# Patient Record
Sex: FEMALE | Race: WHITE | Employment: OTHER | URBAN - METROPOLITAN AREA
[De-identification: names, ages, dates, MRNs, and addresses within clinical notes are randomized per-mention and may not be internally consistent; named-entity substitution may affect disease eponyms.]

---

## 2019-03-27 ENCOUNTER — HOSPITAL ENCOUNTER (OUTPATIENT)
Dept: GENERAL RADIOLOGY | Age: 74
Discharge: HOME | End: 2019-03-27
Payer: MEDICARE

## 2019-03-27 DIAGNOSIS — Z79.891 LONG TERM (CURRENT) USE OF OPIATE ANALGESIC: ICD-10-CM

## 2019-03-27 DIAGNOSIS — M48.062 SPINAL STENOSIS, LUMBAR REGION, WITH NEUROGENIC CLAUDICATION: ICD-10-CM

## 2019-03-27 DIAGNOSIS — M54.50 LOWER BACK PAIN: ICD-10-CM

## 2019-03-27 PROCEDURE — 72110 X-RAY EXAM L-2 SPINE 4/>VWS: CPT

## 2019-07-15 ENCOUNTER — HOSPITAL ENCOUNTER (OUTPATIENT)
Dept: MRI IMAGING | Age: 74
Discharge: HOME | End: 2019-07-15
Attending: PHYSICAL MEDICINE & REHABILITATION
Payer: MEDICARE

## 2019-07-15 DIAGNOSIS — M54.50 LUMBAGO: ICD-10-CM

## 2019-07-15 DIAGNOSIS — Z79.891 LONG-TERM CURRENT USE OF OPIATE ANALGESIC: ICD-10-CM

## 2019-07-15 DIAGNOSIS — M48.062 SPINAL STENOSIS, LUMBAR REGION WITH NEUROGENIC CLAUDICATION: ICD-10-CM

## 2019-07-15 DIAGNOSIS — M25.511 PAIN IN RIGHT SHOULDER: ICD-10-CM

## 2019-07-15 PROCEDURE — 72148 MRI LUMBAR SPINE W/O DYE: CPT

## 2020-06-16 ENCOUNTER — APPOINTMENT (OUTPATIENT)
Dept: GENERAL RADIOLOGY | Age: 75
DRG: 872 | End: 2020-06-16
Attending: NURSE PRACTITIONER
Payer: MEDICARE

## 2020-06-16 ENCOUNTER — HOSPITAL ENCOUNTER (INPATIENT)
Age: 75
LOS: 7 days | Discharge: HOME HEALTH | DRG: 872 | End: 2020-06-23
Attending: EMERGENCY MEDICINE | Admitting: INTERNAL MEDICINE
Payer: MEDICARE

## 2020-06-16 ENCOUNTER — APPOINTMENT (OUTPATIENT)
Dept: CT IMAGING | Age: 75
DRG: 872 | End: 2020-06-16
Attending: NURSE PRACTITIONER
Payer: MEDICARE

## 2020-06-16 DIAGNOSIS — N19 PRERENAL RENAL FAILURE: ICD-10-CM

## 2020-06-16 DIAGNOSIS — S22.31XA CLOSED FRACTURE OF ONE RIB OF RIGHT SIDE, INITIAL ENCOUNTER: ICD-10-CM

## 2020-06-16 DIAGNOSIS — R31.9 URINARY TRACT INFECTION WITH HEMATURIA, SITE UNSPECIFIED: Primary | ICD-10-CM

## 2020-06-16 DIAGNOSIS — A41.9 SEPSIS, DUE TO UNSPECIFIED ORGANISM, UNSPECIFIED WHETHER ACUTE ORGAN DYSFUNCTION PRESENT (HCC): ICD-10-CM

## 2020-06-16 DIAGNOSIS — T79.6XXA TRAUMATIC RHABDOMYOLYSIS, INITIAL ENCOUNTER (HCC): ICD-10-CM

## 2020-06-16 DIAGNOSIS — N39.0 URINARY TRACT INFECTION WITH HEMATURIA, SITE UNSPECIFIED: Primary | ICD-10-CM

## 2020-06-16 PROBLEM — K74.60 CIRRHOSIS (HCC): Status: ACTIVE | Noted: 2020-06-16

## 2020-06-16 PROBLEM — E03.9 HYPOTHYROIDISM: Status: ACTIVE | Noted: 2020-06-16

## 2020-06-16 PROBLEM — R74.01 TRANSAMINITIS: Status: ACTIVE | Noted: 2020-06-16

## 2020-06-16 PROBLEM — R91.8 LUNG NODULES: Status: ACTIVE | Noted: 2020-06-16

## 2020-06-16 PROBLEM — R65.10 SIRS (SYSTEMIC INFLAMMATORY RESPONSE SYNDROME) (HCC): Status: ACTIVE | Noted: 2020-06-16

## 2020-06-16 PROBLEM — M62.82 RHABDOMYOLYSIS: Status: ACTIVE | Noted: 2020-06-16

## 2020-06-16 PROBLEM — I48.92 ATRIAL FLUTTER (HCC): Status: ACTIVE | Noted: 2020-06-16

## 2020-06-16 PROBLEM — I10 HTN (HYPERTENSION): Status: ACTIVE | Noted: 2020-06-16

## 2020-06-16 PROBLEM — S22.39XA RIB FRACTURE: Status: ACTIVE | Noted: 2020-06-16

## 2020-06-16 PROBLEM — T68.XXXA HYPOTHERMIA: Status: ACTIVE | Noted: 2020-06-16

## 2020-06-16 PROBLEM — N28.9 RENAL INSUFFICIENCY: Status: ACTIVE | Noted: 2020-06-16

## 2020-06-16 LAB
ALBUMIN SERPL-MCNC: 3.2 G/DL (ref 3.5–5)
ALBUMIN/GLOB SERPL: 0.8 {RATIO} (ref 1.1–2.2)
ALP SERPL-CCNC: 131 U/L (ref 45–117)
ALT SERPL-CCNC: 107 U/L (ref 12–78)
ANION GAP SERPL CALC-SCNC: 12 MMOL/L (ref 5–15)
APPEARANCE UR: ABNORMAL
AST SERPL-CCNC: 113 U/L (ref 15–37)
BACTERIA URNS QL MICRO: ABNORMAL /HPF
BASOPHILS # BLD: 0.1 K/UL (ref 0–0.1)
BASOPHILS NFR BLD: 0 % (ref 0–1)
BILIRUB SERPL-MCNC: 0.8 MG/DL (ref 0.2–1)
BILIRUB UR QL: NEGATIVE
BUN SERPL-MCNC: 47 MG/DL (ref 6–20)
BUN/CREAT SERPL: 38 (ref 12–20)
CALCIUM SERPL-MCNC: 8.9 MG/DL (ref 8.5–10.1)
CHLORIDE SERPL-SCNC: 106 MMOL/L (ref 97–108)
CK SERPL-CCNC: 2758 U/L (ref 26–192)
CO2 SERPL-SCNC: 22 MMOL/L (ref 21–32)
COLOR UR: ABNORMAL
COMMENT, HOLDF: NORMAL
CREAT SERPL-MCNC: 1.24 MG/DL (ref 0.55–1.02)
CREAT UR-MCNC: 76 MG/DL
DIFFERENTIAL METHOD BLD: ABNORMAL
EOSINOPHIL # BLD: 0 K/UL (ref 0–0.4)
EOSINOPHIL NFR BLD: 0 % (ref 0–7)
EPITH CASTS URNS QL MICRO: ABNORMAL /LPF
ERYTHROCYTE [DISTWIDTH] IN BLOOD BY AUTOMATED COUNT: 16 % (ref 11.5–14.5)
GLOBULIN SER CALC-MCNC: 4.1 G/DL (ref 2–4)
GLUCOSE SERPL-MCNC: 114 MG/DL (ref 65–100)
GLUCOSE UR STRIP.AUTO-MCNC: NEGATIVE MG/DL
HCT VFR BLD AUTO: 37.7 % (ref 35–47)
HGB BLD-MCNC: 12.2 G/DL (ref 11.5–16)
HGB UR QL STRIP: ABNORMAL
IMM GRANULOCYTES # BLD AUTO: 0.1 K/UL (ref 0–0.04)
IMM GRANULOCYTES NFR BLD AUTO: 0 % (ref 0–0.5)
KETONES UR QL STRIP.AUTO: 40 MG/DL
LACTATE BLD-SCNC: 1.82 MMOL/L (ref 0.4–2)
LEUKOCYTE ESTERASE UR QL STRIP.AUTO: ABNORMAL
LYMPHOCYTES # BLD: 1 K/UL (ref 0.8–3.5)
LYMPHOCYTES NFR BLD: 5 % (ref 12–49)
MAGNESIUM SERPL-MCNC: 2.8 MG/DL (ref 1.6–2.4)
MCH RBC QN AUTO: 26.6 PG (ref 26–34)
MCHC RBC AUTO-ENTMCNC: 32.4 G/DL (ref 30–36.5)
MCV RBC AUTO: 82.3 FL (ref 80–99)
MONOCYTES # BLD: 1.3 K/UL (ref 0–1)
MONOCYTES NFR BLD: 6 % (ref 5–13)
MUCOUS THREADS URNS QL MICRO: ABNORMAL /LPF
NEUTS SEG # BLD: 18.4 K/UL (ref 1.8–8)
NEUTS SEG NFR BLD: 89 % (ref 32–75)
NITRITE UR QL STRIP.AUTO: NEGATIVE
NRBC # BLD: 0 K/UL (ref 0–0.01)
NRBC BLD-RTO: 0 PER 100 WBC
PH UR STRIP: 5.5 [PH] (ref 5–8)
PLATELET # BLD AUTO: 414 K/UL (ref 150–400)
PMV BLD AUTO: 10.3 FL (ref 8.9–12.9)
POTASSIUM SERPL-SCNC: 3.7 MMOL/L (ref 3.5–5.1)
POTASSIUM UR-SCNC: 36 MMOL/L
PROT SERPL-MCNC: 7.3 G/DL (ref 6.4–8.2)
PROT UR STRIP-MCNC: 30 MG/DL
RBC # BLD AUTO: 4.58 M/UL (ref 3.8–5.2)
RBC #/AREA URNS HPF: ABNORMAL /HPF (ref 0–5)
SAMPLES BEING HELD,HOLD: NORMAL
SODIUM SERPL-SCNC: 140 MMOL/L (ref 136–145)
SODIUM UR-SCNC: 18 MMOL/L
SP GR UR REFRACTOMETRY: 1.02 (ref 1–1.03)
TROPONIN I SERPL-MCNC: <0.05 NG/ML
TSH SERPL DL<=0.05 MIU/L-ACNC: 0.1 UIU/ML (ref 0.36–3.74)
UR CULT HOLD, URHOLD: NORMAL
UROBILINOGEN UR QL STRIP.AUTO: 1 EU/DL (ref 0.2–1)
WBC # BLD AUTO: 20.8 K/UL (ref 3.6–11)
WBC URNS QL MICRO: ABNORMAL /HPF (ref 0–4)

## 2020-06-16 PROCEDURE — 77030038269 HC DRN EXT URIN PURWCK BARD -A

## 2020-06-16 PROCEDURE — 72125 CT NECK SPINE W/O DYE: CPT

## 2020-06-16 PROCEDURE — 74011250636 HC RX REV CODE- 250/636: Performed by: EMERGENCY MEDICINE

## 2020-06-16 PROCEDURE — 93005 ELECTROCARDIOGRAM TRACING: CPT

## 2020-06-16 PROCEDURE — 65660000000 HC RM CCU STEPDOWN

## 2020-06-16 PROCEDURE — 71250 CT THORAX DX C-: CPT

## 2020-06-16 PROCEDURE — 83605 ASSAY OF LACTIC ACID: CPT

## 2020-06-16 PROCEDURE — 99285 EMERGENCY DEPT VISIT HI MDM: CPT

## 2020-06-16 PROCEDURE — 82550 ASSAY OF CK (CPK): CPT

## 2020-06-16 PROCEDURE — 80053 COMPREHEN METABOLIC PANEL: CPT

## 2020-06-16 PROCEDURE — 74011250637 HC RX REV CODE- 250/637: Performed by: INTERNAL MEDICINE

## 2020-06-16 PROCEDURE — 73562 X-RAY EXAM OF KNEE 3: CPT

## 2020-06-16 PROCEDURE — 77030019905 HC CATH URETH INTMIT MDII -A

## 2020-06-16 PROCEDURE — 83735 ASSAY OF MAGNESIUM: CPT

## 2020-06-16 PROCEDURE — 82570 ASSAY OF URINE CREATININE: CPT

## 2020-06-16 PROCEDURE — 84300 ASSAY OF URINE SODIUM: CPT

## 2020-06-16 PROCEDURE — 70450 CT HEAD/BRAIN W/O DYE: CPT

## 2020-06-16 PROCEDURE — 87040 BLOOD CULTURE FOR BACTERIA: CPT

## 2020-06-16 PROCEDURE — 85025 COMPLETE CBC W/AUTO DIFF WBC: CPT

## 2020-06-16 PROCEDURE — 84443 ASSAY THYROID STIM HORMONE: CPT

## 2020-06-16 PROCEDURE — 87086 URINE CULTURE/COLONY COUNT: CPT

## 2020-06-16 PROCEDURE — 84484 ASSAY OF TROPONIN QUANT: CPT

## 2020-06-16 PROCEDURE — 84439 ASSAY OF FREE THYROXINE: CPT

## 2020-06-16 PROCEDURE — 74176 CT ABD & PELVIS W/O CONTRAST: CPT

## 2020-06-16 PROCEDURE — 87077 CULTURE AEROBIC IDENTIFY: CPT

## 2020-06-16 PROCEDURE — 36415 COLL VENOUS BLD VENIPUNCTURE: CPT

## 2020-06-16 PROCEDURE — 74011000250 HC RX REV CODE- 250: Performed by: NURSE PRACTITIONER

## 2020-06-16 PROCEDURE — 81001 URINALYSIS AUTO W/SCOPE: CPT

## 2020-06-16 PROCEDURE — 84133 ASSAY OF URINE POTASSIUM: CPT

## 2020-06-16 PROCEDURE — 87186 SC STD MICRODIL/AGAR DIL: CPT

## 2020-06-16 PROCEDURE — 96374 THER/PROPH/DIAG INJ IV PUSH: CPT

## 2020-06-16 PROCEDURE — 96360 HYDRATION IV INFUSION INIT: CPT

## 2020-06-16 PROCEDURE — 74011250636 HC RX REV CODE- 250/636: Performed by: NURSE PRACTITIONER

## 2020-06-16 PROCEDURE — 74011250636 HC RX REV CODE- 250/636: Performed by: INTERNAL MEDICINE

## 2020-06-16 RX ORDER — SODIUM CHLORIDE 0.9 % (FLUSH) 0.9 %
5-40 SYRINGE (ML) INJECTION EVERY 8 HOURS
Status: DISCONTINUED | OUTPATIENT
Start: 2020-06-16 | End: 2020-06-23 | Stop reason: HOSPADM

## 2020-06-16 RX ORDER — SODIUM CHLORIDE, SODIUM LACTATE, POTASSIUM CHLORIDE, CALCIUM CHLORIDE 600; 310; 30; 20 MG/100ML; MG/100ML; MG/100ML; MG/100ML
100 INJECTION, SOLUTION INTRAVENOUS CONTINUOUS
Status: DISCONTINUED | OUTPATIENT
Start: 2020-06-16 | End: 2020-06-21

## 2020-06-16 RX ORDER — FUROSEMIDE 40 MG/1
40 TABLET ORAL
COMMUNITY

## 2020-06-16 RX ORDER — HYDROCODONE BITARTRATE AND ACETAMINOPHEN 10; 325 MG/1; MG/1
1.5 TABLET ORAL
COMMUNITY

## 2020-06-16 RX ORDER — AMLODIPINE BESYLATE 10 MG/1
10 TABLET ORAL DAILY
COMMUNITY

## 2020-06-16 RX ORDER — METRONIDAZOLE 500 MG/100ML
500 INJECTION, SOLUTION INTRAVENOUS EVERY 8 HOURS
Status: DISCONTINUED | OUTPATIENT
Start: 2020-06-16 | End: 2020-06-19

## 2020-06-16 RX ORDER — PRAVASTATIN SODIUM 20 MG/1
20 TABLET ORAL
Status: DISCONTINUED | OUTPATIENT
Start: 2020-06-16 | End: 2020-06-23 | Stop reason: HOSPADM

## 2020-06-16 RX ORDER — OMEPRAZOLE 20 MG/1
20 CAPSULE, DELAYED RELEASE ORAL DAILY
COMMUNITY

## 2020-06-16 RX ORDER — NALOXONE HYDROCHLORIDE 0.4 MG/ML
0.4 INJECTION, SOLUTION INTRAMUSCULAR; INTRAVENOUS; SUBCUTANEOUS AS NEEDED
Status: DISCONTINUED | OUTPATIENT
Start: 2020-06-16 | End: 2020-06-23 | Stop reason: HOSPADM

## 2020-06-16 RX ORDER — METOPROLOL SUCCINATE 50 MG/1
75 TABLET, EXTENDED RELEASE ORAL DAILY
COMMUNITY

## 2020-06-16 RX ORDER — LOSARTAN POTASSIUM 25 MG/1
25 TABLET ORAL DAILY
COMMUNITY

## 2020-06-16 RX ORDER — ACETAMINOPHEN 325 MG/1
650 TABLET ORAL
Status: DISCONTINUED | OUTPATIENT
Start: 2020-06-16 | End: 2020-06-23 | Stop reason: HOSPADM

## 2020-06-16 RX ORDER — GABAPENTIN 300 MG/1
300 CAPSULE ORAL 3 TIMES DAILY
COMMUNITY

## 2020-06-16 RX ORDER — LEVOTHYROXINE SODIUM 50 UG/1
50 TABLET ORAL
Status: DISCONTINUED | OUTPATIENT
Start: 2020-06-17 | End: 2020-06-18

## 2020-06-16 RX ORDER — PRAVASTATIN SODIUM 20 MG/1
20 TABLET ORAL
COMMUNITY

## 2020-06-16 RX ORDER — SODIUM CHLORIDE 0.9 % (FLUSH) 0.9 %
5-40 SYRINGE (ML) INJECTION AS NEEDED
Status: DISCONTINUED | OUTPATIENT
Start: 2020-06-16 | End: 2020-06-23 | Stop reason: HOSPADM

## 2020-06-16 RX ORDER — OXYCODONE AND ACETAMINOPHEN 5; 325 MG/1; MG/1
1 TABLET ORAL
Status: COMPLETED | OUTPATIENT
Start: 2020-06-16 | End: 2020-06-16

## 2020-06-16 RX ORDER — SODIUM CHLORIDE, SODIUM LACTATE, POTASSIUM CHLORIDE, CALCIUM CHLORIDE 600; 310; 30; 20 MG/100ML; MG/100ML; MG/100ML; MG/100ML
2000 INJECTION, SOLUTION INTRAVENOUS CONTINUOUS
Status: DISCONTINUED | OUTPATIENT
Start: 2020-06-16 | End: 2020-06-16

## 2020-06-16 RX ORDER — PANTOPRAZOLE SODIUM 40 MG/1
40 TABLET, DELAYED RELEASE ORAL
Status: DISCONTINUED | OUTPATIENT
Start: 2020-06-17 | End: 2020-06-23 | Stop reason: HOSPADM

## 2020-06-16 RX ORDER — LEVOTHYROXINE SODIUM 50 UG/1
50 TABLET ORAL
COMMUNITY

## 2020-06-16 RX ADMIN — PRAVASTATIN SODIUM 20 MG: 20 TABLET ORAL at 21:09

## 2020-06-16 RX ADMIN — SODIUM CHLORIDE, SODIUM LACTATE, POTASSIUM CHLORIDE, AND CALCIUM CHLORIDE 2000 ML: 600; 310; 30; 20 INJECTION, SOLUTION INTRAVENOUS at 15:55

## 2020-06-16 RX ADMIN — OXYCODONE HYDROCHLORIDE AND ACETAMINOPHEN 1 TABLET: 5; 325 TABLET ORAL at 18:09

## 2020-06-16 RX ADMIN — WATER 2 G: 1 INJECTION INTRAMUSCULAR; INTRAVENOUS; SUBCUTANEOUS at 15:55

## 2020-06-16 RX ADMIN — METRONIDAZOLE 500 MG: 500 INJECTION, SOLUTION INTRAVENOUS at 18:09

## 2020-06-16 RX ADMIN — APIXABAN 5 MG: 5 TABLET, FILM COATED ORAL at 18:09

## 2020-06-16 RX ADMIN — SODIUM CHLORIDE, SODIUM LACTATE, POTASSIUM CHLORIDE, AND CALCIUM CHLORIDE 75 ML/HR: 600; 310; 30; 20 INJECTION, SOLUTION INTRAVENOUS at 19:14

## 2020-06-16 RX ADMIN — SODIUM CHLORIDE 1000 ML: 900 INJECTION, SOLUTION INTRAVENOUS at 15:55

## 2020-06-16 RX ADMIN — Medication 10 ML: at 19:14

## 2020-06-16 NOTE — PROGRESS NOTES
BSI: MED RECONCILIATION    Comments/Recommendations:   Patient alert and oriented for medication reconciliation, bottles present at bedside for review and patient confirmed this was all of her medications. Patient took Eliquis this morning prior to hospital arrival but has not taken any other medications for 2 days due to acute situation. Patient obtains Eliquis samples from outpatient MD.   Confirmed allergies and updated preferred pharmacy to [facility] on [facility] [place]. Medications added:     Gabapentin  Metoprolol succ  Norco  Pravastatin  Furosemide  Omeprazole  Levothyroxine  Losartan  Amlodipine  Eliquis     Medications removed:    none    Medications adjusted:    none    Information obtained from: patient, medication bottles, [facility]     Allergies: Metformin    Prior to Admission Medications:     Medication Documentation Review Audit       Reviewed by Kosta Olsen (Pharmacist) on 06/16/20 at 1643      Medication Sig Documenting Provider Last Dose Status Taking? amLODIPine (NORVASC) 10 mg tablet Take 10 mg by mouth daily. Provider, Historical 6/14/2020 Active Yes   apixaban (Eliquis) 5 mg tablet Take 5 mg by mouth two (2) times a day. Provider, Historical 6/16/2020 AM Active Yes   furosemide (LASIX) 40 mg tablet Take 40 mg by mouth daily as needed. Provider, Historical  Active Yes   gabapentin (NEURONTIN) 300 mg capsule Take 300 mg by mouth three (3) times daily. Provider, Historical 6/14/2020 Active Yes   HYDROcodone-acetaminophen (NORCO)  mg tablet Take 1.5 Tabs by mouth every six (6) hours as needed for Pain. Provider, Historical  Active Yes   levothyroxine (SYNTHROID) 50 mcg tablet Take 50 mcg by mouth Daily (before breakfast). Provider, Historical 6/14/2020 Active Yes   losartan (COZAAR) 25 mg tablet Take 25 mg by mouth daily. Provider, Historical 6/14/2020 Active Yes   metoprolol succinate (TOPROL-XL) 50 mg XL tablet Take 75 mg by mouth daily.  Provider, Historical 6/14/2020 Active Yes   omeprazole (PRILOSEC) 20 mg capsule Take 20 mg by mouth daily. Provider, Historical 6/14/2020 Active Yes   pravastatin (PRAVACHOL) 20 mg tablet Take 20 mg by mouth nightly.  Provider, Historical 6/14/2020 Active Yes                      Thank  You,   Jesus Tidwell, PharmD, BCPS   Contact: [phone]

## 2020-06-16 NOTE — ED PROVIDER NOTES
This is a 65-year-old female with a reported past medical history of osteoarthritis (s/p multiple unspecified lower extremity surgical procedures), atrial fibrillation, hypertension, and hyperlipidemia who presents the ER today for the chief complaints of diffuse body pain after a ground-level fall that occurred Sunday afternoon. She states that sometime Sunday afternoon, around 3 PM, she had a fall (unknown if syncopal or mechanical) and was unable to get up. She then remained on the ground all day Sunday, all day Monday, until a neighbor checked on her earlier today and found her on the ground covered in urine and feces. She has a poor recollection around the fall, but states that she was not able to get up because \"I felt too weak\". She states that earlier on Sunday before her fall she was having multiple episodes of loose stool and poor appetite. She also reports having generalized fatigue, poor appetite, and rigors. After the fall, she developed debilitating diffuse body pain, which prevented her from going from a supine position on the floor to a standing position. She is unable to accurately locate the areas of her pain, she simply repeats \"I hurt everywhere\". She reports during her time on the floor she had nothing to eat or drink. At this time, she denies acute visual disturbances, chest pain, palpitations, shortness of breath, loss of sensation in her extremities, and neck pain. No past medical history on file. No past surgical history on file. No family history on file.     Social History     Socioeconomic History    Marital status:      Spouse name: Not on file    Number of children: Not on file    Years of education: Not on file    Highest education level: Not on file   Occupational History    Not on file   Social Needs    Financial resource strain: Not on file    Food insecurity     Worry: Not on file     Inability: Not on file   [employer] needs Medical: Not on file     Non-medical: Not on file   Tobacco Use    Smoking status: Not on file   Substance and Sexual Activity    Alcohol use: Not on file    Drug use: Not on file    Sexual activity: Not on file   Lifestyle    Physical activity     Days per week: Not on file     Minutes per session: Not on file    Stress: Not on file   Relationships    Social connections     Talks on phone: Not on file     Gets together: Not on file     Attends Holiness service: Not on file     Active member of club or organization: Not on file     Attends meetings of clubs or organizations: Not on file     Relationship status: Not on file    Intimate partner violence     Fear of current or ex partner: Not on file     Emotionally abused: Not on file     Physically abused: Not on file     Forced sexual activity: Not on file   Other Topics Concern    Not on file   Social History Narrative    Not on file         ALLERGIES: Metformin    Review of Systems   Constitutional: Positive for activity change, appetite change, chills and fatigue. Negative for fever. HENT: Negative for sore throat and trouble swallowing. Eyes: Negative for photophobia and visual disturbance. Respiratory: Positive for cough. Negative for shortness of breath. Cardiovascular: Negative for chest pain, palpitations and leg swelling. Gastrointestinal: Positive for abdominal pain, diarrhea and nausea. Negative for abdominal distention and vomiting. Endocrine: Positive for cold intolerance. Genitourinary: Negative for dysuria and flank pain. Musculoskeletal: Positive for arthralgias, back pain, gait problem, joint swelling and myalgias. Negative for neck stiffness. Skin: Negative for rash. Neurological: Positive for dizziness and weakness. Hematological: Bruises/bleeds easily.        Vitals:    06/16/20 1402 06/16/20 1405 06/16/20 1408 06/16/20 1411   BP: 122/70 120/61 112/69    Pulse: (!) 125 (!) 120 (!) 125    Resp: 19 20 19    Temp: SpO2: 100% 100% 100% 100%   Weight:       Height:                Physical Exam  Vitals signs and nursing note reviewed. Constitutional:       General: She is in acute distress. Appearance: Normal appearance. She is obese. She is not ill-appearing. HENT:      Head: Normocephalic and atraumatic. Right Ear: External ear normal.      Left Ear: External ear normal.      Nose: Nose normal.      Mouth/Throat:      Mouth: Mucous membranes are dry. Eyes:      General: No scleral icterus. Extraocular Movements: Extraocular movements intact. Conjunctiva/sclera: Conjunctivae normal.      Pupils: Pupils are equal, round, and reactive to light. Neck:      Musculoskeletal: Normal range of motion and neck supple. No neck rigidity or muscular tenderness. Cardiovascular:      Rate and Rhythm: Tachycardia present. Pulses:           Radial pulses are 1+ on the right side and 1+ on the left side. Heart sounds: Normal heart sounds. No murmur. No friction rub. No gallop. Pulmonary:      Effort: Tachypnea present. Breath sounds: Normal breath sounds. Decreased air movement present. Abdominal:      General: Abdomen is flat. Bowel sounds are decreased. Palpations: Abdomen is rigid. There is hepatomegaly. Tenderness: There is generalized abdominal tenderness. Musculoskeletal: Normal range of motion. Arms:    Skin:     General: Skin is warm and dry. Coloration: Skin is not pale. Comments: Very dry mucous membranes   Neurological:      General: No focal deficit present. Mental Status: She is alert and oriented to person, place, and time. Psychiatric:         Mood and Affect: Mood normal.         Behavior: Behavior normal.         Thought Content:  Thought content normal.         Judgment: Judgment normal.          MDM      VITAL SIGNS:  Patient Vitals for the past 4 hrs:   Temp Pulse Resp BP SpO2   06/16/20 1446 (!) 95.9 °F (35.5 °C)       06/16/20 1437  (!) 126 25 118/74    06/16/20 1436     100 %   06/16/20 1434  (!) 126 18 119/63 97 %   06/16/20 1422  (!) 126 17 113/61 100 %   06/16/20 1417  (!) 126 19 128/78 100 %   06/16/20 1411     100 %   06/16/20 1411    116/80    06/16/20 1408  (!) 125 19 112/69 100 %   06/16/20 1405  (!) 120 20 120/61 100 %   06/16/20 1402  (!) 125 19 122/70 100 %   06/16/20 1400  (!) 125 22 121/69 100 %   06/16/20 1359 97.7 °F (36.5 °C) (!) 125 22 121/69 100 %   06/16/20 1356    121/74 100 %         LABS:  Recent Results (from the past 6 hour(s))   EKG, 12 LEAD, INITIAL    Collection Time: 06/16/20  2:02 PM   Result Value Ref Range    Ventricular Rate 125 BPM    Atrial Rate 250 BPM    QRS Duration 80 ms    Q-T Interval 310 ms    QTC Calculation (Bezet) 447 ms    Calculated R Axis 51 degrees    Calculated T Axis 50 degrees    Diagnosis       Atrial flutter with 2:1 AV conduction  ST & T wave abnormality, consider inferior ischemia  ST & T wave abnormality, consider anterior ischemia  Abnormal ECG  No previous ECGs available     POC LACTIC ACID    Collection Time: 06/16/20  2:05 PM   Result Value Ref Range    Lactic Acid (POC) 1.82 0.40 - 2.00 mmol/L   SAMPLES BEING HELD    Collection Time: 06/16/20  2:06 PM   Result Value Ref Range    SAMPLES BEING HELD 1SST,1RD,1BL     COMMENT        Add-on orders for these samples will be processed based on acceptable specimen integrity and analyte stability, which may vary by analyte.    CBC WITH AUTOMATED DIFF    Collection Time: 06/16/20  2:06 PM   Result Value Ref Range    WBC 20.8 (H) 3.6 - 11.0 K/uL    RBC 4.58 3.80 - 5.20 M/uL    HGB 12.2 11.5 - 16.0 g/dL    HCT 37.7 35.0 - 47.0 %    MCV 82.3 80.0 - 99.0 FL    MCH 26.6 26.0 - 34.0 PG    MCHC 32.4 30.0 - 36.5 g/dL    RDW 16.0 (H) 11.5 - 14.5 %    PLATELET 044 (H) 152 - 400 K/uL    MPV 10.3 8.9 - 12.9 FL    NRBC 0.0 0  WBC    ABSOLUTE NRBC 0.00 0.00 - 0.01 K/uL    NEUTROPHILS 89 (H) 32 - 75 %    LYMPHOCYTES 5 (L) 12 - 49 %    MONOCYTES 6 5 - 13 %    EOSINOPHILS 0 0 - 7 %    BASOPHILS 0 0 - 1 %    IMMATURE GRANULOCYTES 0 0.0 - 0.5 %    ABS. NEUTROPHILS 18.4 (H) 1.8 - 8.0 K/UL    ABS. LYMPHOCYTES 1.0 0.8 - 3.5 K/UL    ABS. MONOCYTES 1.3 (H) 0.0 - 1.0 K/UL    ABS. EOSINOPHILS 0.0 0.0 - 0.4 K/UL    ABS. BASOPHILS 0.1 0.0 - 0.1 K/UL    ABS. IMM. GRANS. 0.1 (H) 0.00 - 0.04 K/UL    DF AUTOMATED     CK    Collection Time: 06/16/20  2:06 PM   Result Value Ref Range    CK 2,758 (H) 26 - 192 U/L   MAGNESIUM    Collection Time: 06/16/20  2:06 PM   Result Value Ref Range    Magnesium 2.8 (H) 1.6 - 2.4 mg/dL   METABOLIC PANEL, COMPREHENSIVE    Collection Time: 06/16/20  2:06 PM   Result Value Ref Range    Sodium 140 136 - 145 mmol/L    Potassium 3.7 3.5 - 5.1 mmol/L    Chloride 106 97 - 108 mmol/L    CO2 22 21 - 32 mmol/L    Anion gap 12 5 - 15 mmol/L    Glucose 114 (H) 65 - 100 mg/dL    BUN 47 (H) 6 - 20 MG/DL    Creatinine 1.24 (H) 0.55 - 1.02 MG/DL    BUN/Creatinine ratio 38 (H) 12 - 20      GFR est AA 51 (L) >60 ml/min/1.73m2    GFR est non-AA 42 (L) >60 ml/min/1.73m2    Calcium 8.9 8.5 - 10.1 MG/DL    Bilirubin, total 0.8 0.2 - 1.0 MG/DL    ALT (SGPT) 107 (H) 12 - 78 U/L    AST (SGOT) 113 (H) 15 - 37 U/L    Alk.  phosphatase 131 (H) 45 - 117 U/L    Protein, total 7.3 6.4 - 8.2 g/dL    Albumin 3.2 (L) 3.5 - 5.0 g/dL    Globulin 4.1 (H) 2.0 - 4.0 g/dL    A-G Ratio 0.8 (L) 1.1 - 2.2     TROPONIN I    Collection Time: 06/16/20  2:06 PM   Result Value Ref Range    Troponin-I, Qt. <0.05 <0.05 ng/mL   TSH 3RD GENERATION    Collection Time: 06/16/20  2:06 PM   Result Value Ref Range    TSH 0.10 (L) 0.36 - 3.74 uIU/mL   URINALYSIS W/MICROSCOPIC    Collection Time: 06/16/20  2:44 PM   Result Value Ref Range    Color YELLOW/STRAW      Appearance CLOUDY (A) CLEAR      Specific gravity 1.017 1.003 - 1.030      pH (UA) 5.5 5.0 - 8.0      Protein 30 (A) NEG mg/dL    Glucose Negative NEG mg/dL    Ketone 40 (A) NEG mg/dL    Bilirubin Negative NEG Blood LARGE (A) NEG      Urobilinogen 1.0 0.2 - 1.0 EU/dL    Nitrites Negative NEG      Leukocyte Esterase MODERATE (A) NEG      WBC  0 - 4 /hpf    RBC 0-5 0 - 5 /hpf    Epithelial cells FEW FEW /lpf    Bacteria 2+ (A) NEG /hpf    Mucus 1+ (A) NEG /lpf   URINE CULTURE HOLD SAMPLE    Collection Time: 06/16/20  2:44 PM   Result Value Ref Range    Urine culture hold        Urine on hold in Microbiology dept for 2 days. If unpreserved urine is submitted, it cannot be used for addtional testing after 24 hours, recollection will be required. IMAGING:  CT HEAD WO CONT   Final Result   IMPRESSION: No acute changes. CT SPINE CERV WO CONT   Final Result   IMPRESSION: Prominent spondylosis, no acute findings seen. CT CHEST WO CONT   Final Result   IMPRESSION:       Pulmonary micronodules   Incidental sigmoid diverticulosis. Cirrhosis with mild intrahepatic biliary dilatation. CT ABD PELV WO CONT   Final Result   IMPRESSION:       Pulmonary micronodules   Incidental sigmoid diverticulosis. Cirrhosis with mild intrahepatic biliary dilatation. XR KNEE LT 3 V   Final Result   IMPRESSION: Left total knee replacement. XR KNEE RT 3 V   Final Result   IMPRESSION: Right total knee replacement            Medications During Visit:  Medications   sodium chloride 0.9 % bolus infusion 1,000 mL (1,000 mL IntraVENous New Bag 6/16/20 1555)   lactated Ringers infusion 2,000 mL (2,000 mL IntraVENous New Bag 6/16/20 1555)   oxyCODONE-acetaminophen (PERCOCET) 5-325 mg per tablet 1 Tab (has no administration in time range)   cefTRIAXone (ROCEPHIN) 2 g in sterile water (preservative free) 20 mL IV syringe (2 g IntraVENous Given 6/16/20 1555)         DECISION MAKING:  Lawyer Dugan is a 75 y.o. female who comes in as above. 3:30:  Blood counts are significant for markedly elevated CK in the absence of troponin elevation. Suspect development of rhabdomyolysis secondary to prolonged downtime.   3 L of crystalloids ordered. WBC count greater than 20,000 with left shift. Urinalysis suspicious for UTI. Ceftriaxone started for antibiotic coverage. CT of abdomen and stool studies pending. 4:19 PM  Patient's level of alertness has seemed to improve after IV fluids. CT scans and x-rays are unremarkable for any acute process, however, she reports continual pain and inability to elevate her lower extremities. Percocet administered at this time. Repeat EKG to determine if patient is in sinus tachycardia versus A. fib/a flutter. Difficult to determine on initial EKG due to large degree of artifact. Hypothermia resolved with warm fluids and blankets    BUN to creatinine ratio suspicious for prerenal etiology. Urine electrolytes pending. Elevated CK with hematuria, with very few RBCs leans towards rhabdomyolysis. Aggressive hydration in process. 5:00 PM  Repeat EKG obtained at 1651. Sinus tachycardia at a rate of 123 bpm.    Sepsis Re-Assessment Documentation:     Date: 6/16/2020   Time: 4:21 PM      Vital Signs  Level of Consciousness: Alert  Temp: 97.8 °F (36.6 °C)  Temp Source: Oral  Pulse (Heart Rate): (!) 126  Heart Rate Source: Monitor  Cardiac Rhythm: Atrial fibrillation(aib rvr )  Resp Rate: 25  BP: 118/74  MAP (Monitor): 84  MAP (Calculated): 86  BP 1 Location: Left arm  BP 1 Method: Automatic  BP Patient Position: At rest      IMPRESSION:  1. Urinary tract infection with hematuria, site unspecified    2. Sepsis, due to unspecified organism, unspecified whether acute organ dysfunction present ([address]    3. Traumatic rhabdomyolysis, initial encounter ([address]    4. Prerenal renal failure        DISPOSITION:  Admitted      Hospitalist Perfect Serve for Admission  4:27 PM    ED Room Number: ER20/20  Patient Name and age:  Earlyne Arm 75 y.o.  female  Working Diagnosis:   1. Urinary tract infection with hematuria, site unspecified    2.  Sepsis, due to unspecified organism, unspecified whether acute organ dysfunction present ([facility]    3. Traumatic rhabdomyolysis, initial encounter ([facility]    4. Prerenal renal failure        COVID-19 Suspicion:  no    Code Status:  Full Code  Readmission: no  Isolation Requirements:  no - Enteric until stool studies result  Recommended Level of Care:  step down  Department:[address] ED - [phone]  Other: RASHMI likely due to mixture of prerenal and rhabdomyolysis (prolonged down time). Sepsis - fluid responsive. Ground-level fall with loss of consciousness, now nonweightbearing. CT scans / xrays unremarkable    I was personally available for consultation in the emergency department. I have reviewed the chart and agree with the documentation recorded by the [facility], including the assessment, treatment plan, and disposition.   Basil Scott MD

## 2020-06-16 NOTE — ACP (ADVANCE CARE PLANNING)
Advance Care Planning (ACP) Provider Note - Comprehensive      Date of ACP Conversation:  06/16/20    Persons included in Conversation:  patient   Length of ACP Conversation in minutes:  16 minutes     Authorized Decision Maker (if patient is incapable of making informed decisions): This person is: son, Kendall Jeffries, who lives in [facility] Patients with Decision Making Capacity:  Importance of advance care planning, including choosing a healthcare agent to communicate patient's healthcare decisions if patient lost the ability to make decisions. Review of Existing Advance Directive:  Patient does have a current advance directive however not readily available for review. She reports her son Kendall Jeffries being her MPOA     Active Diagnoses:   Patient Active Problem List   Diagnosis Code    Cirrhosis ([facility] K74.60    Transaminitis R74.0    Lung nodules R91.8    Atrial flutter ([facility] I48.92    HTN (hypertension) I10    Renal insufficiency N28.9    Rhabdomyolysis M62.82    SIRS (systemic inflammatory response syndrome) ([facility] R65.10    Hypothermia T68. [place]    Rib fracture S22.39XA    Sepsis ([facility] A41.9    Hypothyroidism E03.9         These active diagnoses are of sufficient risk that focused discussion on advance care planning is indicated in order to allow the patient to thoughtfully consider personal goals of care; and, if situations arise that prevent the ability to personally give input, to ensure appropriate representation of their personal desires for different levels and aggressiveness of care. For Serious or Chronic Illness:  Understanding of medical condition     Reviewed pt's clinical status. Carlita Ott has multiple medical problems including AF, HTN, and is being admitted for fall, rhabdo, AF with RVR, possible sepsis. We reviewed our treatment plan and anticipated discharge plans.  Further, we discussed pt's wishes on how she would like to proceed (aggressive/heroic resuscitation vs not intervening and allowing nature takes its course) if she were to suffer cardiopulmonary arrest.    Understanding of CPR, goals and expected outcomes, benefits and burdens discussed. Information on CPR success provided (many factors lower a patients chance of survival, including advanced age, performance status, malignancy, and presence of multiple comorbidities); Individual asked to communicate understanding of information in his/her own words. CPR works best to restart the heart when there is a sudden event, like a heart attack, in someone who is otherwise healthy. Unfortunately, CPR does not typically restart the heart for people who have serious health conditions or who are very sick. \"     \"In the event your heart stopped as a result of an underlying serious health condition, would you want attempts to be made to restart your heart (answer \"yes\" for attempt to resuscitate) or would you prefer a natural death (answer \"no\" for do not attempt to resuscitate)? \"    Patient made it clear to me that she would not want to remain on life support for a prolonged period however does want an attempt at heroic measures for resuscitation in the event of cardiopulmonary arrest, including chest compressions, defibrillation, intubation/mechanical ventilation. She is alert and oriented x 4 and informs me that her son is aware of her wishes.      FULL CODE

## 2020-06-16 NOTE — ED TRIAGE NOTES
Pt arrives via EMS for c/o GLF 2 days ago. EMS reports that pt fell 2 days ago outside and refused transport to the hospital. They also report that she has been outside on the ground for 2 days. C/o generalized pain, most specifically in back and left hip. Has not taken meds in 2 days. Hx of afib.

## 2020-06-16 NOTE — ED NOTES
TRANSFER - OUT REPORT:    Verbal report given to [facility] (name) on Erma Wright  being transferred to [facility] (unit) for routine progression of care       Report consisted of patients Situation, Background, Assessment and   Recommendations(SBAR). Information from the following report(s) SBAR, Kardex, ED Summary, Procedure Summary and Intake/Output was reviewed with the receiving nurse. Lines:   Peripheral IV 06/16/20 Right Antecubital (Active)   Site Assessment Clean, dry, & intact 6/16/2020  2:02 PM   Phlebitis Assessment 0 6/16/2020  2:02 PM   Infiltration Assessment 0 6/16/2020  2:02 PM   Dressing Status Clean, dry, & intact 6/16/2020  2:02 PM   Dressing Type Transparent 6/16/2020  2:02 PM   Hub Color/Line Status Pink 6/16/2020  2:02 PM   Action Taken Blood drawn 6/16/2020  2:02 PM   Alcohol Cap Used No 6/16/2020  2:02 PM       Peripheral IV 06/16/20 Left Antecubital (Active)        Opportunity for questions and clarification was provided.       Patient transported with:   Monitor  Registered Nurse

## 2020-06-16 NOTE — PROGRESS NOTES
Gave verbal sign-out over the phone at 5:43 PM to Dr. Anya Gamez whose practice has accepted care of this patient.       Yann Gibson MD  6/16/2020

## 2020-06-16 NOTE — H&P
[facility]  [address] [facility], [facility] 19  [phone]    Hospitalist Admission Note      NAME:  Tessie Reyes   :      MRN:  [id number]     PCP:  Kaitlyn Barry MD     Date of Service/Time:  2020 4:47 PM         Assessment / Plan:        SIRS (systemic inflammatory response syndrome): possible sepsis with 4/4 SIRS (hypothermia likely environmental as patient was found in her yard, leukocytosis, tachycardia, tachypnea). Likely from UTI. Not apparently severe sepsis. Lactate WNR. Also noting diarrhea. Send C. Diff. IV CTX and IV Flagyl for now, pending stool studies and urine /blood cultures. Cont IVF; monitor for volume overload     Fall: found down in her yard by neighbor, soiled in her own urine and feces due to inability to get up. Check echo, monitor on tele. PT/OT. Likely needs placement. CM consult      Atrial flutter with RVR: HR expected to decrease with treatment as above; monitor closely for need to start IV diltiazem gtt. Continue metoprolol and Eliquis. Cardiology consult. TTE      Cirrhosis: with elevated liver enzymes which may be related to rhabdo. Denies ETOH use. GI consult      Renal insufficiency / Rhabdomyolysis: continue IVF. Follow BMP. Repeat CK      Rib fracture: pain control, incentive spirometer      Lung nodules: will need outpatient follow up      Hypothyroidism: TSH low. Other than tachycardia does not appear hyperthyroid. Add on FT4. Cont Synthroid for now, pending FT4      HTN (hypertension): BP normal. Cont metoprolol otherwise hold other antihypertensives       Code Status: FULL     Surrogate decision maker: son      ED notes and lab results reviewed.        Total time spent with patient: 60 Minutes   Time spent in the care of this patient included reviewing records, discussing with nursing, obtaining history and examining the patient, and discussing treatment plans, with >50% time spent counseling/coordinating care    Risk of deterioration: High                 Care Plan discussed with: ED provider, Patient, Nursing Staff and >50% of time spent in counseling and coordination of care    Discussed:  Care Plan and D/C Planning    Prophylaxis:  Eliquis    Disposition:  SNF/LTC                 Subjective:     CHIEF COMPLAINT:     HISTORY OF PRESENT ILLNESS:     Ms. Flor Sandoval is a 75 y.o. female w/ hx of AF, HTN who presents with fall. Patient was found down in her yard by her neighbor, after lying there for 2 days. She could not provide details of the fall, but does report that she was too weak to get up to get help. She does report recent diarrhea, urinary incontinence however otherwise denies fevers of chills, abdominal pain, n/v, CP, SOB, cough, HA. She thought she had dizziness. ED workup showed SIRS criteria, UTI, elevated liver enzymes, and elevated CK. Ms. Flor Sandoval is admitted for further evaluation and management. PMH: AF, HTN    PSH: knee replacement    Social History     Tobacco Use    Smoking status: denies   Substance Use Topics    Alcohol use: Prior use (age 27-40s)        Family History: No family history of cirrhosis    Allergies   Allergen Reactions    Metformin Other (comments)     Metformin toxicity         Prior to Admission medications    Medication Sig Start Date End Date Taking? Authorizing Provider   gabapentin (NEURONTIN) 300 mg capsule Take 300 mg by mouth three (3) times daily. Yes Provider, Historical   metoprolol succinate (TOPROL-XL) 50 mg XL tablet Take 75 mg by mouth daily. Yes Provider, Historical   HYDROcodone-acetaminophen (NORCO)  mg tablet Take 1.5 Tabs by mouth every six (6) hours as needed for Pain. Yes Provider, Historical   pravastatin (PRAVACHOL) 20 mg tablet Take 20 mg by mouth nightly. Yes Provider, Historical   furosemide (LASIX) 40 mg tablet Take 40 mg by mouth daily as needed.    Yes Provider, Historical   omeprazole (PRILOSEC) 20 mg capsule Take 20 mg by mouth daily. Yes Provider, Historical   levothyroxine (SYNTHROID) 50 mcg tablet Take 50 mcg by mouth Daily (before breakfast). Yes Provider, Historical   losartan (COZAAR) 25 mg tablet Take 25 mg by mouth daily. Yes Provider, Historical   amLODIPine (NORVASC) 10 mg tablet Take 10 mg by mouth daily. Yes Provider, Historical   apixaban (Eliquis) 5 mg tablet Take 5 mg by mouth two (2) times a day. Yes Provider, Historical       Review of Systems:  (bold if positive, if negative)    Gen:  fatigueEyes:  ENT:  CVS:  Pulm:  GI:  diarrheaGU:  incontinenceMS:  Pain, weakness, swelling, arthritisSkin:  Psych:  Endo:  Hem:  bruisingRenal:  Neuro:            Objective:      VITALS:    Vital signs reviewed; most recent are:    Visit Vitals  /74   Pulse (!) 126   Temp 97.8 °F (36.6 °C)   Resp 25   Ht 5' 8\" (1.727 m)   Wt 99.8 kg (220 lb)   SpO2 100%   BMI 33.45 kg/m²     SpO2 Readings from Last 6 Encounters:   06/16/20 100%            Intake/Output Summary (Last 24 hours) at 6/16/2020 1647  Last data filed at 6/16/2020 1447  Gross per 24 hour   Intake    Output 200 ml   Net -200 ml            Exam:     Physical Exam:    Gen: elderly, obese, chronically ill-appearing. NAD  HEENT:  No scleral icterus, PERRL, hearing intact to voice, dry mucous membranes  Neck:  Supple, without masses. Resp:  No accessory muscle use. Increased WOB. Mildly diminished in bases without wheezing, rales, rhonchi  Card: HR 120s, irregularly irregular. No murmurs, rubs, or gallops. No peripheral lower extremity edema. No JVD. Peripheral pulses in tact. Abd:  Normoactive bowel sounds. Soft, non-tender, non-distended. No rebound, no guarding. No appreciable hepatosplenomegaly   Musc:  No cyanosis or clubbing  Skin:  No rashes or ulcers; turgor intact. Cap refill ~2 secs.  Bruising noted, including on both knees  Neuro:  Cranial nerves 3-12 intact, generalized motor weakness, follows commands appropriately  Psych: fair insight, normal affect. Alert, oriented x 3. Answers questions appropriately       Labs:    Recent Labs     06/16/20  1406   WBC 20.8*   HGB 12.2   HCT 37.7   *     Recent Labs     06/16/20  1406      K 3.7      CO2 22   *   BUN 47*   CREA 1.24*   CA 8.9   MG 2.8*   ALB 3.2*   *     No components found for: GLPOC  No results for input(s): PH, PCO2, PO2, HCO3, FIO2 in the last 72 hours. No results for input(s): INR, INREXT in the last 72 hours. No results found for: SDES  No results found for: CULT  All other current labs reviewed in the computer. Imaging/Studies:    Ct Head Wo Cont    Result Date: 6/16/2020  IMPRESSION: No acute changes. Ct Chest Wo Cont    Result Date: 6/16/2020  IMPRESSION: Pulmonary micronodules Incidental sigmoid diverticulosis. Cirrhosis with mild intrahepatic biliary dilatation. Ct Spine Cerv Wo Cont    Result Date: 6/16/2020  IMPRESSION: Prominent spondylosis, no acute findings seen. Ct Abd Pelv Wo Cont    Result Date: 6/16/2020  IMPRESSION: Pulmonary micronodules Incidental sigmoid diverticulosis. Cirrhosis with mild intrahepatic biliary dilatation. Xr Knee Lt 3 V    Result Date: 6/16/2020  IMPRESSION: Left total knee replacement. Xr Knee Rt 3 V    Result Date: 6/16/2020  IMPRESSION: Right total knee replacement      Imaging personally reviewed.     EKG: AF with RVR, ST-T changes  EKG personally reviewed    ___________________________________________________    Attending Physician: Dav Wood MD

## 2020-06-16 NOTE — ROUTINE PROCESS
4440 [address] with Dr. Umair Marsh to clarify orders. [address] Patient is asking about some of her medications. Spoke with Dr. Carly Dasilva. No new orders at this time. 1302 Patient declining any pain interventions. Offered tylenol and warm packs. Assisted with hair. Barrier cream applied to patient's buttock. Patient thought she had to have a bowel movement but was unable to. 
 
0100 Notified Dr. Carly Dasilva of patients heart rate sustaining 130. Wants to monitor for now. 1011 [facility] EKG showed sinus tach. 0370 Patient had one formed bowel movement this morning. 7401 Bedside and Verbal shift change report given to Liz Lou (oncoming nurse) by Tommy Beck (offgoing nurse). Report included the following information SBAR, Kardex, ED Summary, Procedure Summary, Intake/Output, MAR, Recent Results and Cardiac Rhythm Sinus Tach.

## 2020-06-16 NOTE — ROUTINE PROCESS
1810-TRANSFER - IN REPORT: 
 
Verbal report received from [place] (name) on Renay Tejeda  being received from 2001 [facility] (unit) for routine progression of care Report consisted of patients Situation, Background, Assessment and  
Recommendations(SBAR). Information from the following report(s) SBAR, Kardex, Intake/Output, MAR, Accordion, Recent Results and Med Rec Status was reviewed with the receiving nurse. Opportunity for questions and clarification was provided. Assessment completed upon patients arrival to unit and care assumed. 1900-Bedside and Verbal shift change report given to Eda Parker  (oncoming nurse) by Daryl Schroeder (offgoing nurse). Report included the following information SBAR, Kardex, Intake/Output, MAR, Accordion, Recent Results and Med Rec Status. Dual skin completed. Excoriations noted in groin, and buttocks. Scattered bruising throughout. Wound care orders and mobility team placed. STAND completed.

## 2020-06-17 ENCOUNTER — APPOINTMENT (OUTPATIENT)
Dept: NON INVASIVE DIAGNOSTICS | Age: 75
DRG: 872 | End: 2020-06-17
Attending: INTERNAL MEDICINE
Payer: MEDICARE

## 2020-06-17 ENCOUNTER — APPOINTMENT (OUTPATIENT)
Dept: ULTRASOUND IMAGING | Age: 75
DRG: 872 | End: 2020-06-17
Attending: NURSE PRACTITIONER
Payer: MEDICARE

## 2020-06-17 LAB
ALBUMIN SERPL-MCNC: 2.7 G/DL (ref 3.5–5)
ALBUMIN/GLOB SERPL: 0.7 {RATIO} (ref 1.1–2.2)
ALP SERPL-CCNC: 118 U/L (ref 45–117)
ALT SERPL-CCNC: 88 U/L (ref 12–78)
AMMONIA PLAS-SCNC: 17 UMOL/L
ANION GAP SERPL CALC-SCNC: 8 MMOL/L (ref 5–15)
AST SERPL-CCNC: 83 U/L (ref 15–37)
ATRIAL RATE: 123 BPM
ATRIAL RATE: 127 BPM
ATRIAL RATE: 250 BPM
BASOPHILS # BLD: 0 K/UL (ref 0–0.1)
BASOPHILS NFR BLD: 0 % (ref 0–1)
BILIRUB SERPL-MCNC: 0.4 MG/DL (ref 0.2–1)
BUN SERPL-MCNC: 36 MG/DL (ref 6–20)
BUN/CREAT SERPL: 38 (ref 12–20)
CALCIUM SERPL-MCNC: 8.6 MG/DL (ref 8.5–10.1)
CALCULATED P AXIS, ECG09: 57 DEGREES
CALCULATED R AXIS, ECG10: 49 DEGREES
CALCULATED R AXIS, ECG10: 51 DEGREES
CALCULATED R AXIS, ECG10: 63 DEGREES
CALCULATED T AXIS, ECG11: -112 DEGREES
CALCULATED T AXIS, ECG11: 50 DEGREES
CALCULATED T AXIS, ECG11: 79 DEGREES
CHLORIDE SERPL-SCNC: 112 MMOL/L (ref 97–108)
CK SERPL-CCNC: 1390 U/L (ref 26–192)
CO2 SERPL-SCNC: 21 MMOL/L (ref 21–32)
CREAT SERPL-MCNC: 0.95 MG/DL (ref 0.55–1.02)
DIAGNOSIS, 93000: NORMAL
DIFFERENTIAL METHOD BLD: ABNORMAL
EOSINOPHIL # BLD: 0 K/UL (ref 0–0.4)
EOSINOPHIL NFR BLD: 0 % (ref 0–7)
ERYTHROCYTE [DISTWIDTH] IN BLOOD BY AUTOMATED COUNT: 16.3 % (ref 11.5–14.5)
GLOBULIN SER CALC-MCNC: 3.7 G/DL (ref 2–4)
GLUCOSE SERPL-MCNC: 164 MG/DL (ref 65–100)
HAV IGM SER QL: NONREACTIVE
HBV CORE IGM SER QL: NONREACTIVE
HBV SURFACE AG SER QL: 0.25 INDEX
HBV SURFACE AG SER QL: NEGATIVE
HCT VFR BLD AUTO: 35.7 % (ref 35–47)
HCV AB SERPL QL IA: NONREACTIVE
HCV COMMENT,HCGAC: NORMAL
HGB BLD-MCNC: 11 G/DL (ref 11.5–16)
IMM GRANULOCYTES # BLD AUTO: 0.1 K/UL (ref 0–0.04)
IMM GRANULOCYTES NFR BLD AUTO: 1 % (ref 0–0.5)
LYMPHOCYTES # BLD: 0.9 K/UL (ref 0.8–3.5)
LYMPHOCYTES NFR BLD: 6 % (ref 12–49)
MAGNESIUM SERPL-MCNC: 2.2 MG/DL (ref 1.6–2.4)
MCH RBC QN AUTO: 26.4 PG (ref 26–34)
MCHC RBC AUTO-ENTMCNC: 30.8 G/DL (ref 30–36.5)
MCV RBC AUTO: 85.8 FL (ref 80–99)
MONOCYTES # BLD: 0.7 K/UL (ref 0–1)
MONOCYTES NFR BLD: 5 % (ref 5–13)
NEUTS SEG # BLD: 14.2 K/UL (ref 1.8–8)
NEUTS SEG NFR BLD: 88 % (ref 32–75)
NRBC # BLD: 0 K/UL (ref 0–0.01)
NRBC BLD-RTO: 0 PER 100 WBC
P-R INTERVAL, ECG05: 206 MS
PHOSPHATE SERPL-MCNC: 3.3 MG/DL (ref 2.6–4.7)
PLATELET # BLD AUTO: 309 K/UL (ref 150–400)
PMV BLD AUTO: 10.4 FL (ref 8.9–12.9)
POTASSIUM SERPL-SCNC: 3.5 MMOL/L (ref 3.5–5.1)
PROT SERPL-MCNC: 6.4 G/DL (ref 6.4–8.2)
Q-T INTERVAL, ECG07: 198 MS
Q-T INTERVAL, ECG07: 306 MS
Q-T INTERVAL, ECG07: 310 MS
QRS DURATION, ECG06: 78 MS
QRS DURATION, ECG06: 80 MS
QRS DURATION, ECG06: 84 MS
QTC CALCULATION (BEZET), ECG08: 283 MS
QTC CALCULATION (BEZET), ECG08: 447 MS
QTC CALCULATION (BEZET), ECG08: 450 MS
RBC # BLD AUTO: 4.16 M/UL (ref 3.8–5.2)
SODIUM SERPL-SCNC: 141 MMOL/L (ref 136–145)
SP1: NORMAL
SP2: NORMAL
SP3: NORMAL
T4 FREE SERPL-MCNC: 1.9 NG/DL (ref 0.8–1.5)
VENTRICULAR RATE, ECG03: 123 BPM
VENTRICULAR RATE, ECG03: 125 BPM
VENTRICULAR RATE, ECG03: 130 BPM
WBC # BLD AUTO: 16 K/UL (ref 3.6–11)

## 2020-06-17 PROCEDURE — 85025 COMPLETE CBC W/AUTO DIFF WBC: CPT

## 2020-06-17 PROCEDURE — 74011250636 HC RX REV CODE- 250/636: Performed by: INTERNAL MEDICINE

## 2020-06-17 PROCEDURE — 93005 ELECTROCARDIOGRAM TRACING: CPT

## 2020-06-17 PROCEDURE — 74011250637 HC RX REV CODE- 250/637: Performed by: FAMILY MEDICINE

## 2020-06-17 PROCEDURE — 82550 ASSAY OF CK (CPK): CPT

## 2020-06-17 PROCEDURE — 36415 COLL VENOUS BLD VENIPUNCTURE: CPT

## 2020-06-17 PROCEDURE — 80074 ACUTE HEPATITIS PANEL: CPT

## 2020-06-17 PROCEDURE — 82140 ASSAY OF AMMONIA: CPT

## 2020-06-17 PROCEDURE — 84100 ASSAY OF PHOSPHORUS: CPT

## 2020-06-17 PROCEDURE — 74011250637 HC RX REV CODE- 250/637: Performed by: INTERNAL MEDICINE

## 2020-06-17 PROCEDURE — 77030038269 HC DRN EXT URIN PURWCK BARD -A

## 2020-06-17 PROCEDURE — 83735 ASSAY OF MAGNESIUM: CPT

## 2020-06-17 PROCEDURE — 80053 COMPREHEN METABOLIC PANEL: CPT

## 2020-06-17 PROCEDURE — 76700 US EXAM ABDOM COMPLETE: CPT

## 2020-06-17 PROCEDURE — 74011250637 HC RX REV CODE- 250/637: Performed by: STUDENT IN AN ORGANIZED HEALTH CARE EDUCATION/TRAINING PROGRAM

## 2020-06-17 PROCEDURE — 65660000000 HC RM CCU STEPDOWN

## 2020-06-17 PROCEDURE — 77030041247 HC PROTECTOR HEEL HEELMEDIX MDII -B

## 2020-06-17 PROCEDURE — 97161 PT EVAL LOW COMPLEX 20 MIN: CPT

## 2020-06-17 PROCEDURE — 97535 SELF CARE MNGMENT TRAINING: CPT | Performed by: OCCUPATIONAL THERAPIST

## 2020-06-17 PROCEDURE — 97530 THERAPEUTIC ACTIVITIES: CPT

## 2020-06-17 PROCEDURE — 97165 OT EVAL LOW COMPLEX 30 MIN: CPT | Performed by: OCCUPATIONAL THERAPIST

## 2020-06-17 PROCEDURE — 74011250637 HC RX REV CODE- 250/637: Performed by: NURSE PRACTITIONER

## 2020-06-17 PROCEDURE — 74011000258 HC RX REV CODE- 258: Performed by: INTERNAL MEDICINE

## 2020-06-17 RX ORDER — LOSARTAN POTASSIUM 25 MG/1
25 TABLET ORAL DAILY
Status: DISCONTINUED | OUTPATIENT
Start: 2020-06-17 | End: 2020-06-17

## 2020-06-17 RX ORDER — POTASSIUM CHLORIDE 750 MG/1
40 TABLET, FILM COATED, EXTENDED RELEASE ORAL
Status: COMPLETED | OUTPATIENT
Start: 2020-06-17 | End: 2020-06-17

## 2020-06-17 RX ORDER — AMLODIPINE BESYLATE 5 MG/1
10 TABLET ORAL DAILY
Status: DISCONTINUED | OUTPATIENT
Start: 2020-06-17 | End: 2020-06-17

## 2020-06-17 RX ORDER — METOPROLOL TARTRATE 50 MG/1
50 TABLET ORAL EVERY 6 HOURS
Status: DISCONTINUED | OUTPATIENT
Start: 2020-06-17 | End: 2020-06-17

## 2020-06-17 RX ORDER — METOPROLOL TARTRATE 25 MG/1
25 TABLET, FILM COATED ORAL EVERY 6 HOURS
Status: DISCONTINUED | OUTPATIENT
Start: 2020-06-17 | End: 2020-06-18

## 2020-06-17 RX ORDER — GABAPENTIN 300 MG/1
300 CAPSULE ORAL 3 TIMES DAILY
Status: DISCONTINUED | OUTPATIENT
Start: 2020-06-17 | End: 2020-06-23 | Stop reason: HOSPADM

## 2020-06-17 RX ORDER — HYDROCODONE BITARTRATE AND ACETAMINOPHEN 10; 325 MG/1; MG/1
1 TABLET ORAL
Status: DISCONTINUED | OUTPATIENT
Start: 2020-06-17 | End: 2020-06-23 | Stop reason: HOSPADM

## 2020-06-17 RX ADMIN — GABAPENTIN 300 MG: 300 CAPSULE ORAL at 22:58

## 2020-06-17 RX ADMIN — PRAVASTATIN SODIUM 20 MG: 20 TABLET ORAL at 22:58

## 2020-06-17 RX ADMIN — GABAPENTIN 300 MG: 300 CAPSULE ORAL at 15:23

## 2020-06-17 RX ADMIN — METOPROLOL SUCCINATE 75 MG: 50 TABLET, EXTENDED RELEASE ORAL at 09:00

## 2020-06-17 RX ADMIN — METOPROLOL TARTRATE 25 MG: 25 TABLET, FILM COATED ORAL at 13:26

## 2020-06-17 RX ADMIN — SODIUM CHLORIDE, SODIUM LACTATE, POTASSIUM CHLORIDE, AND CALCIUM CHLORIDE 100 ML/HR: 600; 310; 30; 20 INJECTION, SOLUTION INTRAVENOUS at 06:22

## 2020-06-17 RX ADMIN — Medication 10 ML: at 23:10

## 2020-06-17 RX ADMIN — POTASSIUM CHLORIDE 40 MEQ: 750 TABLET, FILM COATED, EXTENDED RELEASE ORAL at 13:26

## 2020-06-17 RX ADMIN — APIXABAN 5 MG: 5 TABLET, FILM COATED ORAL at 09:00

## 2020-06-17 RX ADMIN — METRONIDAZOLE 500 MG: 500 INJECTION, SOLUTION INTRAVENOUS at 09:00

## 2020-06-17 RX ADMIN — SODIUM CHLORIDE, SODIUM LACTATE, POTASSIUM CHLORIDE, AND CALCIUM CHLORIDE 100 ML/HR: 600; 310; 30; 20 INJECTION, SOLUTION INTRAVENOUS at 23:00

## 2020-06-17 RX ADMIN — HYDROCODONE BITARTRATE AND ACETAMINOPHEN 1 TABLET: 10; 325 TABLET ORAL at 09:42

## 2020-06-17 RX ADMIN — HYDROCODONE BITARTRATE AND ACETAMINOPHEN 1 TABLET: 10; 325 TABLET ORAL at 16:19

## 2020-06-17 RX ADMIN — METOPROLOL TARTRATE 25 MG: 25 TABLET, FILM COATED ORAL at 17:46

## 2020-06-17 RX ADMIN — LEVOTHYROXINE SODIUM 50 MCG: 0.05 TABLET ORAL at 06:22

## 2020-06-17 RX ADMIN — METRONIDAZOLE 500 MG: 500 INJECTION, SOLUTION INTRAVENOUS at 03:28

## 2020-06-17 RX ADMIN — METOPROLOL TARTRATE 25 MG: 25 TABLET, FILM COATED ORAL at 23:10

## 2020-06-17 RX ADMIN — METRONIDAZOLE 500 MG: 500 INJECTION, SOLUTION INTRAVENOUS at 17:46

## 2020-06-17 RX ADMIN — Medication 10 ML: at 06:23

## 2020-06-17 RX ADMIN — APIXABAN 5 MG: 5 TABLET, FILM COATED ORAL at 17:46

## 2020-06-17 RX ADMIN — Medication 10 ML: at 13:27

## 2020-06-17 RX ADMIN — PANTOPRAZOLE SODIUM 40 MG: 40 TABLET, DELAYED RELEASE ORAL at 06:22

## 2020-06-17 RX ADMIN — GABAPENTIN 300 MG: 300 CAPSULE ORAL at 09:42

## 2020-06-17 RX ADMIN — HYDROCODONE BITARTRATE AND ACETAMINOPHEN 1 TABLET: 10; 325 TABLET ORAL at 22:58

## 2020-06-17 RX ADMIN — CEFTRIAXONE 2 G: 2 INJECTION, POWDER, FOR SOLUTION INTRAMUSCULAR; INTRAVENOUS at 15:23

## 2020-06-17 NOTE — PROGRESS NOTES
Records from Dr Kim Monoey reviewed  Office note April 2020  Hx is CaD a/o stents to Lcx & LAD 2010  Hx of mvr -maze and RADHA ligation 10/2019  PAF w hx of [facility] 12/2018

## 2020-06-17 NOTE — PROGRESS NOTES
Problem: Mobility Impaired (Adult and Pediatric)  Goal: *Acute Goals and Plan of Care (Insert Text)  Description: FUNCTIONAL STATUS PRIOR TO ADMISSION: Patient was modified independent using a rollator for functional mobility. HOME SUPPORT PRIOR TO ADMISSION: The patient lived alone with neighbors to provide assistance. Physical Therapy Goals  Initiated 6/17/2020  1. Patient will move from supine to sit and sit to supine  in bed with independence within 7 day(s). 2.  Patient will transfer from bed to chair and chair to bed with independence using the least restrictive device within 7 day(s). 3.  Patient will perform sit to stand with modified independence within 7 day(s). 4.  Patient will ambulate with modified independence for 100 feet with the least restrictive device within 7 day(s). 5.  Patient will ascend/descend 3 stairs with 2 handrail(s) with modified independence within 7 day(s). Outcome: Progressing Towards Goal   PHYSICAL THERAPY EVALUATION  Patient: Tyesha Richards (46 y.o. female)  Date: 6/17/2020  Primary Diagnosis: Sepsis ([facility] [address] [A41.9]        Precautions:   Fall, Contact      ASSESSMENT  Based on the objective data described below, the patient presents with c/o pain 7-10/10, decreased strength, endurance, mobility, balance and safety following fall in yard at home and lying on the ground x2 days before her neighbor found her. Pt now with 11th rib fx and LLE pain in addition to back pain. Per pt, prior to fall she was independent, amb with rollator, mowing her 3 acres, cleaning her pool and driving. Today pt requires upwards of Mod A for bed mobility, Min A to stand and CGA to ambulated 8 ft with rollator. Deferred further ambulation d/t increased pain and fatigue. Returned to supine. Pt states her son can stay with her at discharge and she only wants [facility] therapy though if family can not provide 24/7 supervision and physical assistance pt would benefit from IP rehab placement. Current Level of Function Impacting Discharge (mobility/balance): Mod A bed mobility    Functional Outcome Measure: The patient scored 13/28 on the Tinetti outcome measure which is indicative of high fall risk . Other factors to consider for discharge: lives alone at baseline     Patient will benefit from skilled therapy intervention to address the above noted impairments. PLAN :  Recommendations and Planned Interventions: bed mobility training, transfer training, gait training, therapeutic exercises, neuromuscular re-education, patient and family training/education, and therapeutic activities      Frequency/Duration: Patient will be followed by physical therapy:  5 times a week to address goals. Recommendation for discharge: (in order for the patient to meet his/her long term goals)  Therapy 3 hours per day 5-7 days per week vs home with [facility] and 24/7 supervision/assistance    This discharge recommendation:  Has been made in collaboration with the attending provider and/or case management    IF patient discharges home will need the following DME: patient owns DME required for discharge         SUBJECTIVE:   Patient stated I have a rollator that I like because it always gives me a seat.     OBJECTIVE DATA SUMMARY:   HISTORY:    No past medical history on file. No past surgical history on file.     Personal factors and/or comorbidities impacting plan of care: obesity, chronic back pain, recent loss of son(this year) and (2 years ago)    Home Situation  Home Environment: Private residence  # Steps to Enter: 3  Rails to Enter: Yes  One/Two Story Residence: One story  Living Alone: Yes  Support Systems: Family member(s), Friends \ neighbors  Patient Expects to be Discharged to[de-identified] Private residence  Current DME Used/Available at Home: [address], rollator, [address] [facility], [place], straight, Walker, rolling, Commode, bedside, Tub transfer bench, Grab bars, Shower chair, Adaptive dressing aides  Tub or Shower Type: Tub/Shower combination    EXAMINATION/PRESENTATION/DECISION MAKING:   Critical Behavior:  Neurologic State: Alert, Appropriate for age  Orientation Level: Oriented X4  Cognition: Appropriate for age attention/concentration, Appropriate decision making, Appropriate safety awareness, Follows commands  Safety/Judgement: Awareness of environment, Driving appropriateness, Fall prevention, Home safety, Insight into deficits  Hearing: Auditory  Auditory Impairment: None  Skin:    Edema:   Range Of Motion:  AROM: Generally decreased, functional           PROM: Generally decreased, functional           Strength:    Strength: Generally decreased, functional                    Tone & Sensation:   Tone: Normal                              Coordination:  Coordination: Generally decreased, functional  Vision:   Acuity: Able to read clock/calendar on wall without difficulty; Able to read employee name badge without difficulty  Corrective Lenses: Reading glasses  Functional Mobility:  Bed Mobility:     Supine to Sit: Moderate assistance; Additional time;Assist x1  Sit to Supine: Moderate assistance; Additional time;Assist x1  Scooting: Stand-by assistance  Transfers:  Sit to Stand: Minimum assistance; Additional time;Assist x1(rollator in front of pt)  Stand to Sit: Contact guard assistance        Bed to Chair: Additional time;Assist x1;Minimum assistance(rollator)              Balance:   Sitting: Intact  Standing: Intact; With support(rollator)  Ambulation/Gait Training:  Distance (ft): 8 Feet (ft)  Assistive Device: Gait belt;Walker, rollator  Ambulation - Level of Assistance: Minimal assistance     Gait Description (WDL): Exceptions to WDL  Gait Abnormalities: Step to gait; Decreased step clearance; Antalgic        Base of Support: Widened     Speed/Marlyn: Pace decreased (<100 feet/min); Slow  Step Length: Right shortened;Left shortened                    Functional Measure:  Tinetti test:    Sitting Balance: 1  Arises: 0  Attempts to Rise: 0  Immediate Standing Balance: 1  Standing Balance: 1  Nudged: 0  Eyes Closed: 1  Turn 360 Degrees - Continuous/Discontinuous: 1  Turn 360 Degrees - Steady/Unsteady: 1  Sitting Down: 1  Balance Score: 7 Balance total score  Indication of Gait: 1  R Step Length/Height: 1  L Step Length/Height: 1  R Foot Clearance: 1  L Foot Clearance: 1  Step Symmetry: 1  Step Continuity: 0  Path: 0  Trunk: 0  Walking Time: 0  Gait Score: 6 Gait total score  Total Score: 13/28 Overall total score         Tinetti Tool Score Risk of Falls  <19 = High Fall Risk  19-24 = Moderate Fall Risk  25-28 = Low Fall Risk  Tinetti ME. Performance-Oriented Assessment of Mobility Problems in Elderly Patients. [place] [address]; [id number]. (Scoring Description: PT Bulletin Feb. 10, 1993)    Older adults: Yvette Nicholas et al, 2009; n = 1000 [facility] elderly evaluated with ABC, WILNER, ADL, and IADL)  · Mean WILNER score for males aged 69-68 years = 26.21(3.40)  · Mean WILNER score for females age 69-68 years = 25.16(4.30)  · Mean WILNER score for males over 80 years = 23.29(6.02)  · Mean WILNER score for females over 80 years = 17.20(8.32)            Physical Therapy Evaluation Charge Determination   History Examination Presentation Decision-Making   MEDIUM  Complexity : 1-2 comorbidities / personal factors will impact the outcome/ POC  MEDIUM Complexity : 3 Standardized tests and measures addressing body structure, function, activity limitation and / or participation in recreation  LOW Complexity : Stable, uncomplicated  Other outcome measures Tinetti  LOW       Based on the above components, the patient evaluation is determined to be of the following complexity level: LOW     Pain Rating:  10/10    Activity Tolerance:   Fair  Please refer to the flowsheet for vital signs taken during this treatment.     After treatment patient left in no apparent distress:   Supine in bed, Heels elevated for pressure relief, Call bell within reach, Bed / chair alarm activated, and Side rails x 3    COMMUNICATION/EDUCATION:   The patients plan of care was discussed with: Occupational therapist and Registered nurse. Fall prevention education was provided and the patient/caregiver indicated understanding., Patient/family have participated as able in goal setting and plan of care. , and Patient/family agree to work toward stated goals and plan of care.     Thank you for this referral.  Alba Wilkinson, PT   Time Calculation: 28 mins

## 2020-06-17 NOTE — CONSULTS
[address] [place] [address]. [address] [place] [address] NP  [phone]                    GASTROENTEROLOGY CONSULTATION NOTE              NAME:  Tessie Reyes   :   1945   MRN:   [id number]       Referring Physician:   Dr Alfonzo Edge Date:   2020 3:50 PM    Chief Complaint:    Cirrhosis     History of Present Illness:    Patient is a 75 y.o. who presents presented to the ER after she was found down at home covered in urine and feces. The patient does not recall events which precipitated prior to her arrival at the hospital.  She is currently being treated for SIRS and rhabdo. She was noted to have cirrhosis on a CT scan during her work up here. She denies alcohol use, IVDU, Herbal medications. She has not started any new rx medications. She denies personal and family history of liver disease. PMH:  No past medical history on file. PSH:  No past surgical history on file. Allergies: Allergies   Allergen Reactions    Metformin Other (comments)     Metformin toxicity        Home Medications:  Prior to Admission Medications   Prescriptions Last Dose Informant Patient Reported? Taking? HYDROcodone-acetaminophen (NORCO)  mg tablet   Yes Yes   Sig: Take 1.5 Tabs by mouth every six (6) hours as needed for Pain. amLODIPine (NORVASC) 10 mg tablet 2020  Yes Yes   Sig: Take 10 mg by mouth daily. apixaban (Eliquis) 5 mg tablet 2020 at AM  Yes Yes   Sig: Take 5 mg by mouth two (2) times a day. furosemide (LASIX) 40 mg tablet   Yes Yes   Sig: Take 40 mg by mouth daily as needed. gabapentin (NEURONTIN) 300 mg capsule 2020  Yes Yes   Sig: Take 300 mg by mouth three (3) times daily. levothyroxine (SYNTHROID) 50 mcg tablet 2020  Yes Yes   Sig: Take 50 mcg by mouth Daily (before breakfast). losartan (COZAAR) 25 mg tablet 2020  Yes Yes   Sig: Take 25 mg by mouth daily.    metoprolol succinate (TOPROL-XL) 50 mg XL tablet 2020  Yes Yes   Sig: Take 75 mg by mouth daily. omeprazole (PRILOSEC) 20 mg capsule 6/14/2020  Yes Yes   Sig: Take 20 mg by mouth daily. pravastatin (PRAVACHOL) 20 mg tablet 6/14/2020  Yes Yes   Sig: Take 20 mg by mouth nightly. Facility-Administered Medications: None       Hospital Medications:  Current Facility-Administered Medications   Medication Dose Route Frequency    gabapentin (NEURONTIN) capsule 300 mg  300 mg Oral TID    HYDROcodone-acetaminophen (NORCO)  mg tablet 1 Tab  1 Tab Oral Q6H PRN    metoprolol tartrate (LOPRESSOR) tablet 25 mg  25 mg Oral Q6H    sodium chloride (NS) flush 5-40 mL  5-40 mL IntraVENous Q8H    sodium chloride (NS) flush 5-40 mL  5-40 mL IntraVENous PRN    acetaminophen (TYLENOL) tablet 650 mg  650 mg Oral Q6H PRN    naloxone (NARCAN) injection 0.4 mg  0.4 mg IntraVENous PRN    cefTRIAXone (ROCEPHIN) 2 g in 0.9% sodium chloride (MBP/ADV) 50 mL  2 g IntraVENous Q24H    apixaban (ELIQUIS) tablet 5 mg  5 mg Oral BID    levothyroxine (SYNTHROID) tablet 50 mcg  50 mcg Oral ACB    pantoprazole (PROTONIX) tablet 40 mg  40 mg Oral ACB    pravastatin (PRAVACHOL) tablet 20 mg  20 mg Oral QHS    lactated Ringers infusion  100 mL/hr IntraVENous CONTINUOUS    metroNIDAZOLE (FLAGYL) IVPB premix 500 mg  500 mg IntraVENous Q8H       Social History:  Social History     Tobacco Use    Smoking status: Not on file   Substance Use Topics    Alcohol use: Not on file       Family History:  No family history on file.     Review of Systems:  Constitutional: negative fever, negative chills, negative weight loss  Eyes:   negative visual changes  ENT:   negative sore throat, tongue or lip swelling  Respiratory:  negative cough, negative dyspnea  Cards:  negative for chest pain, palpitations, lower extremity edema  GI:   See HPI  :  negative for frequency, dysuria  Integument:  negative for rash and pruritus  Heme:  negative for easy bruising and gum/nose bleeding  Musculoskel: negative for myalgias,  back pain and muscle weakness  Neuro: negative for headaches, dizziness, vertigo  Psych:  negative for feelings of anxiety, depression     Objective:     Patient Vitals for the past 8 hrs:   BP Temp Pulse Resp SpO2   06/17/20 1442   (!) 127     06/17/20 1431 126/84 97.8 °F (36.6 °C) (!) 127 16 99 %   06/17/20 0832 121/64 98.5 °F (36.9 °C) (!) 132 20 99 %     06/17 0701 - 06/17 1900  In: 566.7 [P.O.:360; I.V.:206.7]  Out: -   06/15 1901 - 06/17 0700  In: 3475.8 [P.O.:200; I.V.:3275.8]  Out: 1000 [Urine:1000]    EXAM:     NEURO-alert, oriented x3, affect appropriate   HEENT-Head: Normocephalic, no lesions, without obvious abnormality. LUNGS-clear to auscultation bilaterally    COR-regular rate and rhythym     ABD- soft, non-tender. Bowel sounds normal. No masses,  no organomegaly     EXT-no edema    Skin - No rash     Data Review     Recent Labs     06/17/20  0120 06/16/20  1406   WBC 16.0* 20.8*   HGB 11.0* 12.2   HCT 35.7 37.7    414*     Recent Labs     06/17/20  0120 06/16/20  1406    140   K 3.5 3.7   * 106   CO2 21 22   BUN 36* 47*   CREA 0.95 1.24*   * 114*   PHOS 3.3  --    CA 8.6 8.9     Recent Labs     06/17/20  0120 06/16/20  1406   * 131*   TP 6.4 7.3   ALB 2.7* 3.2*   GLOB 3.7 4.1*     No results for input(s): INR, PTP, APTT, INREXT in the last 72 hours. Patient Active Problem List   Diagnosis Code    Cirrhosis ([facility] [address] K74.60    Transaminitis R74.0    Lung nodules R91.8    Atrial flutter (HCC) I48.92    HTN (hypertension) I10    Renal insufficiency N28.9    Rhabdomyolysis M62.82    SIRS (systemic inflammatory response syndrome) ([facility] [address] R65.10    Hypothermia T68. [place]ung    Rib fracture S22.39XA    Sepsis ([facility] [address] A41.9    Hypothyroidism E03.9       Assessment and Plan:  Cirrhosis:  Synthetic function of liver is intact. Likely complicated by Rhabdo.     - Hepatitis Panel  - Monitor CMP  - Avoid hepatotoxic medications  - US pending  - Likely will need MRCP once her renal function has improved. Following along      Thanks for allowing me to participate in the care of this patient.   Signed By: Kiana Garcia NP     6/17/2020  3:50 PM

## 2020-06-17 NOTE — WOUND CARE
Wound care consult: 
Initial visit: Consulted for wound orders for excoriations. \" 
 
Patient lying in bed, no distress. Bedside nurse present. Patient admitted after being found down x2 days. Assessment All skin folds and bony prominences assessed, turned with staff assistance. Breast and abdominal folds: Noted moisture damage to skin. Does not appear to be yeast-related. Open areas on skin are pink and moist but no active drainage. Upper arms: scattered bruising, more notably to right elbow and upper posterior arms. Bilateral knees: Abrasions to bilateral knees. Skin is presently scabbed over, no open areas noted, no drainage or redness. Pillow under knees to support due to left hip pain. Left lateral foot: Abrasion noted at 5th toe. No open area at this time. Upper and mid back: Noted dark/purple bruising to back. Per patient, she lay on her back for most of the time she was down. No open areas. Difficult to assess blanching due to bruising. Bilateral buttocks: Moisture associated skin damage to bilateral buttocks and intergluteal cleft, most notably to inferior aspect of buttocks on which is noted a partial thickness skin loss. Serous drainage to scattered areas. Scattered bruising and abrasions to bilateral buttocks also noted. Treatment Breast and abdominal folds: Cleanse with moisture barrier wipes and apply zinc barrier ointment. Perform daily with bath or as needed. Bilateral buttocks: Cleanse with moisture barrier wipes and apply zinc barrier ointment. Perform with daily and as needed with toileting. Skin care given with moisture barrier wipes and zinc barrier ointment Repositioned in bed Heels on [employer] boots Recommendations/Plan Mobility team on board Pressure distribution surface ([facility]) provided Turn, reposition every 2 hours as tolerated, float heels, off loading boots. Reconsult as needed.

## 2020-06-17 NOTE — PROGRESS NOTES
This RRT RN spoke with Jp Henriquez RN (primary nurse) regarding the MEWS Score of 4. Jp Henriquez RN informed this nurse that this patient is in [facility] ER and she has spoken with the physician regarding this patient's increased HR. No assistance needed at this time.

## 2020-06-17 NOTE — PROGRESS NOTES
Daily Progress Note: 6/17/2020  Swetha Villarreal MD    Assessment/Plan:    SIRS (systemic inflammatory response syndrome): possible sepsis with 4/4 SIRS (hypothermia likely environmental as patient was found in her yard, leukocytosis, tachycardia, tachypnea). Likely from UTI. Not apparently severe sepsis. Lactate WNR. Also noting diarrhea. Send C. Diff. IV CTX and IV Flagyl for now, pending stool studies and urine /blood cultures. Cont IVF; monitor for volume overload      Fall: found down in her yard by neighbor, soiled in her own urine and feces due to inability to get up. Check echo, monitor on tele. PT/OT. Likely needs placement. CM consult       Atrial flutter with RVR: HR expected to decrease with treatment as above; monitor closely for need to start IV diltiazem gtt. Continue metoprolol and Eliquis. Cardiology consult. TTE       Cirrhosis: with elevated liver enzymes which may be related to rhabdo. Denies ETOH use. GI consult       Renal insufficiency / Rhabdomyolysis: continue IVF. Follow BMP. Repeat CK       Rib fracture: pain control, incentive spirometer       Lung nodules: will need outpatient follow up       Hypothyroidism: TSH low. Other than tachycardia does not appear hyperthyroid. Add on FT4.  Cont Synthroid for now, pending FT4       HTN (hypertension): BP normal. Cont metoprolol otherwise hold other antihypertensives      Code Status: FULL     Problem List:  Problem List as of 6/17/2020 Date Reviewed: 6/16/2020          Codes Class Noted - Resolved    Cirrhosis ([facility] ICD-10-CM: K74.60  ICD-9-CM: 571.5  6/16/2020 - Present        Transaminitis ICD-10-CM: R74.0  ICD-9-CM: 790.4  6/16/2020 - Present        Lung nodules ICD-10-CM: R91.8  ICD-9-CM: 793.19  6/16/2020 - Present        Atrial flutter ([facility] ICD-10-CM: F56.47  ICD-9-CM: 427.32  6/16/2020 - Present        HTN (hypertension) ICD-10-CM: I10  ICD-9-CM: 401.9  6/16/2020 - Present        Renal insufficiency ICD-10-CM: N28.9  ICD-9-CM: 593.9  2020 - Present        Rhabdomyolysis ICD-10-CM: M62.82  ICD-9-CM: 728.88  2020 - Present        SIRS (systemic inflammatory response syndrome) (HCC) ICD-10-CM: R65.10  ICD-9-CM: 995.90  2020 - Present        Hypothermia ICD-10-CM: T68. [address]  ICD-9-CM: 991.6  2020 - Present        Rib fracture ICD-10-CM: S22.39XA  ICD-9-CM: 807.00  2020 - Present        Sepsis ([address] ICD-10-CM: A41.9  ICD-9-CM: 038.9, 995.91  2020 - Present        Hypothyroidism ICD-10-CM: E03.9  ICD-9-CM: 244.9  2020 - Present            Subjective:    75 y.o. female w/ hx of AF, HTN who presents with fall. Patient was found down in her yard by her neighbor, after lying there for 2 days. She could not provide details of the fall, but does report that she was too weak to get up to get help. She does report recent diarrhea, urinary incontinence however otherwise denies fevers of chills, abdominal pain, n/v, CP, SOB, cough, HA. She thought she had dizziness. ED workup showed SIRS criteria, UTI, elevated liver enzymes, and elevated CK. Ms. Shanon Cota is admitted for further evaluation and management. (Dr Jenniffer Mills)    :  Still having mult loose stools and episodic crampy ab pain. C diff pending when able to collect - nurses report majority of her stools are semi-soft to loose. Her major complaint is low back pain which is chronic. BPs better today so can restart her usual pain meds. Looks like she is in and out of A flutter - Cards to see. CK 1300. WBC better at 16K.       Review of Systems:   A comprehensive review of systems was negative except for that written in the HPI.     Objective:   Physical Exam:   Visit Vitals  /75 (BP 1 Location: Left arm, BP Patient Position: At rest)   Pulse (!) 131   Temp 97.9 °F (36.6 °C)   Resp 22   Ht 5' 8\" (1.727 m)   Wt 105.4 kg (232 lb 5.8 oz)   SpO2 99%   BMI 35.33 kg/m²      O2 Device: Room air  Temp (24hrs), Av.5 °F (36.4 °C), Min:95.9 °F (35.5 °C), Max:97.9 °F (36.6 °C)    No intake/output data recorded. 06/15 1901 - 06/17 0700  In: 3475.8 [P.O.:200; I.V.:3275.8]  Out: 1000 [Urine:1000]  General:  Alert, cooperative, elderly, obese, no distress, appears stated age. Head:  Normocephalic, without obvious abnormality, atraumatic. Eyes:  Conjunctivae/corneas clear. EOMs intact. Throat: Lips, mucosa, and tongue moist..   Neck: Supple, symmetrical, trachea midline, no adenopathy, thyroid: no enlargement/tenderness/nodules, no carotid bruit and no JVD. Lungs:   Clear to auscultation bilaterally. Chest wall:  No tenderness or deformity. Heart:  Irregular, rapid at times. no murmur, click, rub or gallop. Abdomen:   Soft, with mild generalized tenderness. No R/G. Bowel sounds active. No masses,  No organomegaly. Extremities: no cyanosis or edema. No calf tenderness or cords. Pulses: 2+ and symmetric all extremities. Skin: Skin color, texture, turgor normal. No rashes   Neurologic: CNII-XII intact. Alert and oriented X 3.  equal.  No cogwheeling or rigidity. Gait not tested at this time. Sensation grossly normal to touch. Gross motor of extremities normal.       Data Review:       Recent Days:  Recent Labs     06/17/20  0120 06/16/20  1406   WBC 16.0* 20.8*   HGB 11.0* 12.2   HCT 35.7 37.7    414*     Recent Labs     06/17/20  0120 06/16/20  1406    140   K 3.5 3.7   * 106   CO2 21 22   * 114*   BUN 36* 47*   CREA 0.95 1.24*   CA 8.6 8.9   MG 2.2 2.8*   PHOS 3.3  --    ALB 2.7* 3.2*   TBILI 0.4 0.8   ALT 88* 107*     No results for input(s): PH, PCO2, PO2, HCO3, FIO2 in the last 72 hours.     24 Hour Results:  Recent Results (from the past 24 hour(s))   EKG, 12 LEAD, INITIAL    Collection Time: 06/16/20  2:02 PM   Result Value Ref Range    Ventricular Rate 125 BPM    Atrial Rate 250 BPM    QRS Duration 80 ms    Q-T Interval 310 ms    QTC Calculation (Bezet) 447 ms    Calculated R Axis 51 degrees Calculated T Axis 50 degrees    Diagnosis       Atrial flutter with 2:1 AV conduction  ST & T wave abnormality, consider inferior ischemia  ST & T wave abnormality, consider anterior ischemia  Abnormal ECG  No previous ECGs available     POC LACTIC ACID    Collection Time: 06/16/20  2:05 PM   Result Value Ref Range    Lactic Acid (POC) 1.82 0.40 - 2.00 mmol/L   SAMPLES BEING HELD    Collection Time: 06/16/20  2:06 PM   Result Value Ref Range    SAMPLES BEING HELD 1SST,1RD,1BL     COMMENT        Add-on orders for these samples will be processed based on acceptable specimen integrity and analyte stability, which may vary by analyte. CBC WITH AUTOMATED DIFF    Collection Time: 06/16/20  2:06 PM   Result Value Ref Range    WBC 20.8 (H) 3.6 - 11.0 K/uL    RBC 4.58 3.80 - 5.20 M/uL    HGB 12.2 11.5 - 16.0 g/dL    HCT 37.7 35.0 - 47.0 %    MCV 82.3 80.0 - 99.0 FL    MCH 26.6 26.0 - 34.0 PG    MCHC 32.4 30.0 - 36.5 g/dL    RDW 16.0 (H) 11.5 - 14.5 %    PLATELET 810 (H) 528 - 400 K/uL    MPV 10.3 8.9 - 12.9 FL    NRBC 0.0 0  WBC    ABSOLUTE NRBC 0.00 0.00 - 0.01 K/uL    NEUTROPHILS 89 (H) 32 - 75 %    LYMPHOCYTES 5 (L) 12 - 49 %    MONOCYTES 6 5 - 13 %    EOSINOPHILS 0 0 - 7 %    BASOPHILS 0 0 - 1 %    IMMATURE GRANULOCYTES 0 0.0 - 0.5 %    ABS. NEUTROPHILS 18.4 (H) 1.8 - 8.0 K/UL    ABS. LYMPHOCYTES 1.0 0.8 - 3.5 K/UL    ABS. MONOCYTES 1.3 (H) 0.0 - 1.0 K/UL    ABS. EOSINOPHILS 0.0 0.0 - 0.4 K/UL    ABS. BASOPHILS 0.1 0.0 - 0.1 K/UL    ABS. IMM.  GRANS. 0.1 (H) 0.00 - 0.04 K/UL    DF AUTOMATED     CK    Collection Time: 06/16/20  2:06 PM   Result Value Ref Range    CK 2,758 (H) 26 - 192 U/L   MAGNESIUM    Collection Time: 06/16/20  2:06 PM   Result Value Ref Range    Magnesium 2.8 (H) 1.6 - 2.4 mg/dL   METABOLIC PANEL, COMPREHENSIVE    Collection Time: 06/16/20  2:06 PM   Result Value Ref Range    Sodium 140 136 - 145 mmol/L    Potassium 3.7 3.5 - 5.1 mmol/L    Chloride 106 97 - 108 mmol/L    CO2 22 21 - 32 mmol/L Anion gap 12 5 - 15 mmol/L    Glucose 114 (H) 65 - 100 mg/dL    BUN 47 (H) 6 - 20 MG/DL    Creatinine 1.24 (H) 0.55 - 1.02 MG/DL    BUN/Creatinine ratio 38 (H) 12 - 20      GFR est AA 51 (L) >60 ml/min/1.73m2    GFR est non-AA 42 (L) >60 ml/min/1.73m2    Calcium 8.9 8.5 - 10.1 MG/DL    Bilirubin, total 0.8 0.2 - 1.0 MG/DL    ALT (SGPT) 107 (H) 12 - 78 U/L    AST (SGOT) 113 (H) 15 - 37 U/L    Alk. phosphatase 131 (H) 45 - 117 U/L    Protein, total 7.3 6.4 - 8.2 g/dL    Albumin 3.2 (L) 3.5 - 5.0 g/dL    Globulin 4.1 (H) 2.0 - 4.0 g/dL    A-G Ratio 0.8 (L) 1.1 - 2.2     TROPONIN I    Collection Time: 06/16/20  2:06 PM   Result Value Ref Range    Troponin-I, Qt. <0.05 <0.05 ng/mL   TSH 3RD GENERATION    Collection Time: 06/16/20  2:06 PM   Result Value Ref Range    TSH 0.10 (L) 0.36 - 3.74 uIU/mL   URINALYSIS W/MICROSCOPIC    Collection Time: 06/16/20  2:44 PM   Result Value Ref Range    Color YELLOW/STRAW      Appearance CLOUDY (A) CLEAR      Specific gravity 1.017 1.003 - 1.030      pH (UA) 5.5 5.0 - 8.0      Protein 30 (A) NEG mg/dL    Glucose Negative NEG mg/dL    Ketone 40 (A) NEG mg/dL    Bilirubin Negative NEG      Blood LARGE (A) NEG      Urobilinogen 1.0 0.2 - 1.0 EU/dL    Nitrites Negative NEG      Leukocyte Esterase MODERATE (A) NEG      WBC  0 - 4 /hpf    RBC 0-5 0 - 5 /hpf    Epithelial cells FEW FEW /lpf    Bacteria 2+ (A) NEG /hpf    Mucus 1+ (A) NEG /lpf   URINE CULTURE HOLD SAMPLE    Collection Time: 06/16/20  2:44 PM   Result Value Ref Range    Urine culture hold        Urine on hold in Microbiology dept for 2 days. If unpreserved urine is submitted, it cannot be used for addtional testing after 24 hours, recollection will be required. CULTURE, BLOOD, PAIRED    Collection Time: 06/16/20  3:46 PM   Result Value Ref Range    Special Requests: NO SPECIAL REQUESTS      Culture result:        ONE OF FOUR BOTTLES HAS BEEN FLAGGED POSITIVE BY INSTRUMENT.   BOTTLE HAS BEEN SENT TO [facility] TO ASSESS FOR POSSIBLE GROWTH.     Culture result: REMAINING BOTTLE(S) HAS/HAVE NO GROWTH SO FAR     CREATININE, UR, RANDOM    Collection Time: 06/16/20  4:07 PM   Result Value Ref Range    Creatinine, urine 76.00 mg/dL   POTASSIUM, UR, RANDOM    Collection Time: 06/16/20  4:07 PM   Result Value Ref Range    Potassium urine, random 36 MMOL/L   SODIUM, UR, RANDOM    Collection Time: 06/16/20  4:07 PM   Result Value Ref Range    Sodium,urine random 18 MMOL/L   EKG, 12 LEAD, INITIAL    Collection Time: 06/16/20  4:51 PM   Result Value Ref Range    Ventricular Rate 123 BPM    Atrial Rate 123 BPM    P-R Interval 198 ms    QRS Duration 88 ms    Q-T Interval 190 ms    QTC Calculation (Bezet) 272 ms    Calculated P Axis 68 degrees    Calculated R Axis 62 degrees    Calculated T Axis -115 degrees    Diagnosis       Sinus tachycardia  Cannot rule out Inferior infarct , age undetermined  Abnormal ECG  When compared with ECG of 16-JUN-2020 14:02,  MANUAL COMPARISON REQUIRED, DATA IS UNCONFIRMED     EKG, 12 LEAD, INITIAL    Collection Time: 06/17/20 12:46 AM   Result Value Ref Range    Ventricular Rate 130 BPM    Atrial Rate 130 BPM    P-R Interval 140 ms    QRS Duration 78 ms    Q-T Interval 378 ms    QTC Calculation (Bezet) 556 ms    Calculated P Axis 92 degrees    Calculated R Axis 50 degrees    Calculated T Axis 114 degrees    Diagnosis       Sinus tachycardia  Nonspecific ST and T wave abnormality  Abnormal ECG  When compared with ECG of 16-JUN-2020 16:51,  MANUAL COMPARISON REQUIRED, DATA IS UNCONFIRMED     METABOLIC PANEL, COMPREHENSIVE    Collection Time: 06/17/20  1:20 AM   Result Value Ref Range    Sodium 141 136 - 145 mmol/L    Potassium 3.5 3.5 - 5.1 mmol/L    Chloride 112 (H) 97 - 108 mmol/L    CO2 21 21 - 32 mmol/L    Anion gap 8 5 - 15 mmol/L    Glucose 164 (H) 65 - 100 mg/dL    BUN 36 (H) 6 - 20 MG/DL    Creatinine 0.95 0.55 - 1.02 MG/DL    BUN/Creatinine ratio 38 (H) 12 - 20      GFR est AA >60 >60 ml/min/1.73m2 GFR est non-AA 57 (L) >60 ml/min/1.73m2    Calcium 8.6 8.5 - 10.1 MG/DL    Bilirubin, total 0.4 0.2 - 1.0 MG/DL    ALT (SGPT) 88 (H) 12 - 78 U/L    AST (SGOT) 83 (H) 15 - 37 U/L    Alk. phosphatase 118 (H) 45 - 117 U/L    Protein, total 6.4 6.4 - 8.2 g/dL    Albumin 2.7 (L) 3.5 - 5.0 g/dL    Globulin 3.7 2.0 - 4.0 g/dL    A-G Ratio 0.7 (L) 1.1 - 2.2     CBC WITH AUTOMATED DIFF    Collection Time: 06/17/20  1:20 AM   Result Value Ref Range    WBC 16.0 (H) 3.6 - 11.0 K/uL    RBC 4.16 3.80 - 5.20 M/uL    HGB 11.0 (L) 11.5 - 16.0 g/dL    HCT 35.7 35.0 - 47.0 %    MCV 85.8 80.0 - 99.0 FL    MCH 26.4 26.0 - 34.0 PG    MCHC 30.8 30.0 - 36.5 g/dL    RDW 16.3 (H) 11.5 - 14.5 %    PLATELET 738 813 - 480 K/uL    MPV 10.4 8.9 - 12.9 FL    NRBC 0.0 0  WBC    ABSOLUTE NRBC 0.00 0.00 - 0.01 K/uL    NEUTROPHILS 88 (H) 32 - 75 %    LYMPHOCYTES 6 (L) 12 - 49 %    MONOCYTES 5 5 - 13 %    EOSINOPHILS 0 0 - 7 %    BASOPHILS 0 0 - 1 %    IMMATURE GRANULOCYTES 1 (H) 0.0 - 0.5 %    ABS. NEUTROPHILS 14.2 (H) 1.8 - 8.0 K/UL    ABS. LYMPHOCYTES 0.9 0.8 - 3.5 K/UL    ABS. MONOCYTES 0.7 0.0 - 1.0 K/UL    ABS. EOSINOPHILS 0.0 0.0 - 0.4 K/UL    ABS. BASOPHILS 0.0 0.0 - 0.1 K/UL    ABS. IMM.  GRANS. 0.1 (H) 0.00 - 0.04 K/UL    DF AUTOMATED     MAGNESIUM    Collection Time: 06/17/20  1:20 AM   Result Value Ref Range    Magnesium 2.2 1.6 - 2.4 mg/dL   PHOSPHORUS    Collection Time: 06/17/20  1:20 AM   Result Value Ref Range    Phosphorus 3.3 2.6 - 4.7 MG/DL   CK    Collection Time: 06/17/20  1:20 AM   Result Value Ref Range    CK 1,390 (H) 26 - 192 U/L   AMMONIA    Collection Time: 06/17/20  3:41 AM   Result Value Ref Range    Ammonia 17 <32 UMOL/L       Medications reviewed  Current Facility-Administered Medications   Medication Dose Route Frequency    sodium chloride (NS) flush 5-40 mL  5-40 mL IntraVENous Q8H    sodium chloride (NS) flush 5-40 mL  5-40 mL IntraVENous PRN    acetaminophen (TYLENOL) tablet 650 mg  650 mg Oral Q6H PRN    naloxone (NARCAN) injection 0.4 mg  0.4 mg IntraVENous PRN    cefTRIAXone (ROCEPHIN) 2 g in 0.9% sodium chloride (MBP/ADV) 50 mL  2 g IntraVENous Q24H    apixaban (ELIQUIS) tablet 5 mg  5 mg Oral BID    levothyroxine (SYNTHROID) tablet 50 mcg  50 mcg Oral ACB    metoprolol succinate (TOPROL-XL) XL tablet 75 mg  75 mg Oral DAILY    pantoprazole (PROTONIX) tablet 40 mg  40 mg Oral ACB    pravastatin (PRAVACHOL) tablet 20 mg  20 mg Oral QHS    lactated Ringers infusion  100 mL/hr IntraVENous CONTINUOUS    metroNIDAZOLE (FLAGYL) IVPB premix 500 mg  500 mg IntraVENous Q8H       Care Plan discussed with: Patient and Nurse    Total time spent with patient: 30 minutes.     Tarah Gray MD

## 2020-06-17 NOTE — PROGRESS NOTES
Problem: Falls - Risk of  Goal: *Absence of Falls  Description: Document Valerio Gamboa Fall Risk and appropriate interventions in the flowsheet. Outcome: Progressing Towards Goal  Note: Fall Risk Interventions:            Medication Interventions: Bed/chair exit alarm, Patient to call before getting OOB, Teach patient to arise slowly    Elimination Interventions: Bed/chair exit alarm, Call light in reach, Patient to call for help with toileting needs, Stay With Me (per policy)    History of Falls Interventions: Bed/chair exit alarm, Investigate reason for fall, Room close to nurse's station         Problem: Patient Education: Go to Patient Education Activity  Goal: Patient/Family Education  Outcome: Progressing Towards Goal     Problem: Risk for Spread of Infection  Goal: Prevent transmission of infectious organism to others  Description: Prevent the transmission of infectious organisms to other patients, staff members, and visitors.   Outcome: Progressing Towards Goal     Problem: Patient Education:  Go to Education Activity  Goal: Patient/Family Education  Outcome: Progressing Towards Goal     Problem: General Medical Care Plan  Goal: *Vital signs within specified parameters  Outcome: Progressing Towards Goal  Goal: *Labs within defined limits  Outcome: Progressing Towards Goal  Goal: *Absence of infection signs and symptoms  Outcome: Progressing Towards Goal  Goal: *Optimal pain control at patient's stated goal  Outcome: Progressing Towards Goal  Goal: *Skin integrity maintained  Outcome: Progressing Towards Goal  Goal: *Fluid volume balance  Outcome: Progressing Towards Goal  Goal: *Optimize nutritional status  Outcome: Progressing Towards Goal  Goal: *Anxiety reduced or absent  Outcome: Progressing Towards Goal  Goal: *Progressive mobility and function (eg: ADL's)  Outcome: Progressing Towards Goal     Problem: Patient Education: Go to Patient Education Activity  Goal: Patient/Family Education  Outcome: Progressing Towards Goal

## 2020-06-17 NOTE — CONSULTS
[facility] [address] [phone]  Mobile [phone]    Cardiology Consult Note    Date of  Admission: 2020  1:52 PM  PCP- MD Mely Ngo MD  :  [id number]   MRN:  [id number]       Reason for consult: SVT  Admission Diagnosis: Sepsis [facility]) [A41.9]    Kelley Larsen is a 75 y.o. female admitted for Sepsis ([address] [A41.9]. Consult requested by Abigail Barfield MD       Subjective:   Sepsis [facility]) [A41.9]        Impression Plan/Recommendation   1. Atrial flutter RVR  2. MVR  3. Sepsis, UTI  4. Hypertension   5. CAD s/p stents x 3?  6. Diarrhea   7. Rhabdomyolysis   8. Fall                  · Obtain records from cardiologists Dr Adarsh Adkins  · Echo to assess for endocarditis w hx of MVR in the setting of sepsis   · V rate 130- continue Toprol XL  · Continue eliquis   · Hold norvasc and losartan   · Replete K  · On C diff precautions    · Holding statin d/t elevated LFTs  ·                Subjective:  Kelley Larsen is a 75 y.o. female I am seeing for Atrial flutter. PMhx as above. Patient admitted with sepsis source expected to be a UTI. The patient is followed by Dr Adarsh Adkins at [address] [place] [address]. The patient reportedly fell and was found down x2 days. She recently had a MVR repair at [facility]. She denies palpitations, chest pain or LE edema. No blood in the stool or urine. She also reports 3 cardiac stents            No past medical history on file. No past surgical history on file.     Hospital Medications:  Current Facility-Administered Medications   Medication Dose Route Frequency    gabapentin (NEURONTIN) capsule 300 mg  300 mg Oral TID    HYDROcodone-acetaminophen (NORCO)  mg tablet 1 Tab  1 Tab Oral Q6H PRN    sodium chloride (NS) flush 5-40 mL  5-40 mL IntraVENous Q8H    sodium chloride (NS) flush 5-40 mL  5-40 mL IntraVENous PRN    acetaminophen (TYLENOL) tablet 650 mg 650 mg Oral Q6H PRN    naloxone (NARCAN) injection 0.4 mg  0.4 mg IntraVENous PRN    cefTRIAXone (ROCEPHIN) 2 g in 0.9% sodium chloride (MBP/ADV) 50 mL  2 g IntraVENous Q24H    apixaban (ELIQUIS) tablet 5 mg  5 mg Oral BID    levothyroxine (SYNTHROID) tablet 50 mcg  50 mcg Oral ACB    metoprolol succinate (TOPROL-XL) XL tablet 75 mg  75 mg Oral DAILY    pantoprazole (PROTONIX) tablet 40 mg  40 mg Oral ACB    pravastatin (PRAVACHOL) tablet 20 mg  20 mg Oral QHS    lactated Ringers infusion  100 mL/hr IntraVENous CONTINUOUS    metroNIDAZOLE (FLAGYL) IVPB premix 500 mg  500 mg IntraVENous Q8H     No current facility-administered medications on file prior to encounter. Current Outpatient Medications on File Prior to Encounter   Medication Sig Dispense Refill    gabapentin (NEURONTIN) 300 mg capsule Take 300 mg by mouth three (3) times daily.  metoprolol succinate (TOPROL-XL) 50 mg XL tablet Take 75 mg by mouth daily.  HYDROcodone-acetaminophen (NORCO)  mg tablet Take 1.5 Tabs by mouth every six (6) hours as needed for Pain.  pravastatin (PRAVACHOL) 20 mg tablet Take 20 mg by mouth nightly.  furosemide (LASIX) 40 mg tablet Take 40 mg by mouth daily as needed.  omeprazole (PRILOSEC) 20 mg capsule Take 20 mg by mouth daily.  levothyroxine (SYNTHROID) 50 mcg tablet Take 50 mcg by mouth Daily (before breakfast).  losartan (COZAAR) 25 mg tablet Take 25 mg by mouth daily.  amLODIPine (NORVASC) 10 mg tablet Take 10 mg by mouth daily.  apixaban (Eliquis) 5 mg tablet Take 5 mg by mouth two (2) times a day. Allergies   Allergen Reactions    Metformin Other (comments)     Metformin toxicity              Review of Symptoms:  Other systems reviewed and negative except as above.     Physical Exam    Visit Vitals  /64 (BP 1 Location: Left arm, BP Patient Position: Head of bed elevated (Comment degrees))   Pulse (!) 132   Temp 98.5 °F (36.9 °C)   Resp 20 Ht 5' 8\" (1.727 m)   Wt 232 lb 5.8 oz (105.4 kg)   SpO2 99%   BMI 35.33 kg/m²     General Appearance:  Well developed, elderly/obese, alert and oriented x 3, and individual in no acute distress. Ears/Nose/Mouth/Throat:   Hearing grossly normal.Normal oral mucosa,no scleral icterus     Neck: Supple no JVD or bruits,no cervical lymphadenopathy   Chest:   Lungs clear to auscultation bilaterally,no rales rhonchi or wheezing   Cardiovascular:  accelerated rate and rhythm, 2/6  Murmur LSB. PMI nondisplaced   Abdomen:   Soft, non-tender, bowel sounds are active. No abdominal bruits   Extremities: No edema bilaterally. Pulses detected, no varicosities   Skin: Warm and dry. Brusing to entire body. Abrasions to zi knees. Sternal scar unremarkable   Neuro  Moves all extermities and neurologically intact                                                       Cardiographics    Telemetry: atrial flutter 130s  ECG: atrial flutter 130    Cardiac testing      Recent Labs     06/17/20  0120 06/16/20  1406   CPK 1,390* 2,758*   TROIQ  --  <0.05       Recent Labs     06/17/20  0120 06/16/20  1406    140   K 3.5 3.7   CO2 21 22   BUN 36* 47*   CREA 0.95 1.24*   * 114*   PHOS 3.3  --    MG 2.2 2.8*   WBC 16.0* 20.8*   HGB 11.0* 12.2   HCT 35.7 37.7    414*       I have discussed the diagnosis with the patient and the intended plan as seen in the above orders. Questions were answered concerning future plans. I have discussed medication side effects and warnings with the patient as well. Alberto Coleman is in agreement to the plan listed above and wishes to proceed. she  was instructed not to smoke, eat heart healthy diet  and to exercise. Thank you for this consult.     Tanika Rosado NP

## 2020-06-17 NOTE — PROGRESS NOTES
CM assessment completed via EMR review and collaboration with nursing staff. Phone call with patient. No contact with patient for this assessment. Reason for Admission:  Sepsis, aflutter RVR, fall with rib fracture, UTI. Hx cirrhosis, renal insufficiency, hypothyroid, HTN, CAD with stents. RUR Score:     14%/low risk                Plan for utilizing home health:      Unsure at this time, has used home health services in the past but unable to remember agency used. PCP: First and Last name:  Dr. Sobeida Ortiz   Name of Practice:    Are you a current patient: Yes/No:  yes   Approximate date of last visit:  6 months ago Can you participate in a virtual visit with your PCP:  no                    Current Advanced Directive/Advance Care Plan:   Not on file, next of kin is son Oliver Wilkinson [phone]. Patient currently requiring medical management including IV antibiotics and potassium replete today. Patient declined working with therapy today due to pain. Transition of Care Plan:   Chart reviewed, demographics verified. CM role and follow up discussed. Patient lives alone. Patient lives in a one story home with 3 steps to enter home. Patient has prescription drug coverage, uses   [address] [facility] Patient is independent, drives, and provides self care. She receives assistance with her care needs and transportation from her son and her neighbors. PLAN:  1. Monitor patient response to treatment and recommendations. 2. Await fruther evaluation, cardiology following. 3. Await PT/OT evaluations. 4. Unsure of DC needs at this time. 5. Patient transport home per family at discharge. 6. CM to monitor for discharge needs.     Care Management Interventions  PCP Verified by CM: Yes(Dr. Felipe Tolliver)  Transition of Care Consult (CM Consult): Discharge Planning  Physical Therapy Consult: Yes  Occupational Therapy Consult: Yes  Current Support Network: Lives Alone  Confirm Follow Up Transport: Family  The Plan for Transition of Care is Related to the Following Treatment Goals : discharge to home  Discharge Location  Discharge Placement: Home with family assistance    Feroz Kong, RN, MSN, Care manager

## 2020-06-17 NOTE — PROGRESS NOTES
0700- Bedside and Verbal shift change report given to PRABHA Resendiz (oncoming nurse) by Lonell Severs RN (offgoing nurse). Report included the following information SBAR, Kardex and ED Summary. 0900- Assisted patient to bedside commode, pt voided and passed BM. Unable to send Cdiff sample d/t soft consistency of BM. Updated Dr Janet Tang. Pt also requesting to restart home pain meds, orders received to restart. 1115- Wound RN at bedside. Heel boots placed. Zinc cream applied to excoriated folds. 1340- Pt in chair with OT, specialty bed in room. 1500- Pt on specialty bed. Heel boots on. Bed alarm on.      1900- Bedside and Verbal shift change report given to [address] [place] [address] Teresa (oncoming nurse) by Shawna Beckford RN (offgoing nurse). Report included the following information SBAR, Kardex and ED Summary.

## 2020-06-17 NOTE — PROGRESS NOTES
Problem: Self Care Deficits Care Plan (Adult)  Goal: *Acute Goals and Plan of Care (Insert Text)  Description:   FUNCTIONAL STATUS PRIOR TO ADMISSION: Patient was modified independent using a rollator for functional mobility. HOME SUPPORT: The patient lived alone with good neighbors, friends and family local.    Occupational Therapy Goals  Initiated 6/17/2020  1. Patient will perform grooming standing at sink with supervision/set-up within 7 day(s). 2.  Patient will perform lower body dressing using adaptive equipment with supervision/set-up within 7 day(s). 3.  Patient will perform toilet transfers with supervision/set-up using rollator within 7 day(s). 4.  Patient will perform all aspects of toileting with supervision/set-up within 7 day(s). 5.  Patient will participate in upper extremity therapeutic exercise/activities with modified independence for 10 minutes within 7 day(s). 6.  Patient will utilize energy conservation techniques during functional activities with verbal and visual cues within 7 day(s). Outcome: Progressing Towards Goal     OCCUPATIONAL THERAPY EVALUATION  Patient: Alysha Bangura (71 y.o. female)  Date: 6/17/2020  Primary Diagnosis: Sepsis ([facility] [address] [A41.9]       Precautions:  Fall, Contact    ASSESSMENT  Based on the objective data described below, the patient presents with c/o pain 8/10 \"all over\", decreased strength, endurance, mobility, balance and safety following fall in yard at home and lying on the ground x2 days before her neighbor found her. Pt has chronic back pain and states she takes pain meds every 4 hours at home for that. Pt now with rib fx and LLE pain in addition to back pain. Per pt, prior to fall she was independent, amb with rollator, mowing her 3 acres, cleaning her pool and driving. Pt states her son can stay with her at discharge and she only wants [facility] therapy.     Current Level of Function Impacting Discharge (ADLs/self-care): max A LE ADLs and toileting, min A functional mobility amb with rollator    Functional Outcome Measure: The patient scored Total: 50/100 on the Barthel Index outcome measure. Other factors to consider for discharge: pt lives alone, but states her son can stay with her 24/7     Patient will benefit from skilled therapy intervention to address the above noted impairments. PLAN :  Recommendations and Planned Interventions: self care training, functional mobility training, therapeutic exercise, balance training, therapeutic activities, endurance activities, patient education, home safety training and family training/education    Frequency/Duration: Patient will be followed by occupational therapy 5 times a week to address goals. Recommendation for discharge: (in order for the patient to meet his/her long term goals)  Occupational therapy at least 2 days/week in the home AND ensure assist and/or supervision for safety with functional mobility    This discharge recommendation:  Has not yet been discussed the attending provider and/or case management    IF patient discharges home will need the following DME: TBD       SUBJECTIVE:   Patient stated I hurt all over. I need my pain meds every 4 hours like I take at home. Why is no one listening to me.     OBJECTIVE DATA SUMMARY:   HISTORY:   No past medical history on file. No past surgical history on file.     Expanded or extensive additional review of patient history:     Home Situation  Home Environment: Private residence  # Steps to Enter: 3  Rails to Enter: Yes  One/Two Story Residence: One story  Living Alone: Yes  Support Systems: Family member(s), Friends \ neighbors  Patient Expects to be Discharged to[de-identified] Private residence  Current DME Used/Available at Home: Asmita Mayo, rollator, [address], [address], straight, Walker, rolling, Commode, bedside, Tub transfer bench, Grab bars, Shower chair, Adaptive dressing aides  Tub or Shower Type: Tub/Shower combination    Hand dominance: Right    EXAMINATION OF PERFORMANCE DEFICITS:  Cognitive/Behavioral Status:  Neurologic State: Alert; Appropriate for age  Orientation Level: Oriented X4  Cognition: Appropriate for age attention/concentration; Appropriate decision making; Appropriate safety awareness; Follows commands  Perception: Appears intact  Perseveration: No perseveration noted  Safety/Judgement: Awareness of environment;Driving appropriateness; Fall prevention;Home safety; Insight into deficits    Hearing: Auditory  Auditory Impairment: None    Vision/Perceptual:    Acuity: Able to read clock/calendar on wall without difficulty; Able to read employee name badge without difficulty    Corrective Lenses: Reading glasses    Range of Motion:  AROM: Generally decreased, functional  PROM: Generally decreased, functional                      Strength:  Strength: Generally decreased, functional                Coordination:  Coordination: Generally decreased, functional  Fine Motor Skills-Upper: Left Impaired;Right Impaired    Gross Motor Skills-Upper: Left Intact; Right Intact    Tone & Sensation:  Tone: Normal                         Balance:  Sitting: Intact  Standing: Intact; With support(rollator)    Functional Mobility and Transfers for ADLs:  Bed Mobility:  Supine to Sit: Moderate assistance; Additional time;Assist x1  Sit to Supine: Moderate assistance; Additional time;Assist x1  Scooting: Stand-by assistance    Transfers:  Sit to Stand: Minimum assistance; Additional time;Assist x1(rollator in front of pt)  Stand to Sit: Contact guard assistance  Bed to Chair: Additional time;Assist x1;Minimum assistance(rollator)  Toilet Transfer : Minimum assistance; Additional time;Assist x1(rollator to [facility])  Assistive Device : Walker, rollator    ADL Assessment and Intervention:  Feeding: Modified independent    Oral Facial Hygiene/Grooming: Setup; Additional time(seated)    Bathing: Moderate assistance; Additional time;Assist x1(seated- A to reach buttocks and feet)    Upper Body Dressing: Setup; Additional time    Lower Body Dressing: Maximum assistance; Additional time;Assist x1(A to reach feet and for standing balance)    Toileting: Maximum assistance; Additional time;Assist x1(A for bowel hygiene and clothing)    Cognitive Retraining  Safety/Judgement: Awareness of environment;Driving appropriateness; Fall prevention;Home safety; Insight into deficits    Functional Measure:  Barthel Index:    Bathin  Bladder: 10  Bowels: 10  Groomin  Dressin  Feeding: 10  Mobility: 0  Stairs: 0  Toilet Use: 5  Transfer (Bed to Chair and Back): 5  Total: 50/100        The Barthel ADL Index: Guidelines  1. The index should be used as a record of what a patient does, not as a record of what a patient could do. 2. The main aim is to establish degree of independence from any help, physical or verbal, however minor and for whatever reason. 3. The need for supervision renders the patient not independent. 4. A patient's performance should be established using the best available evidence. Asking the patient, friends/relatives and nurses are the usual sources, but direct observation and common sense are also important. However direct testing is not needed. 5. Usually the patient's performance over the preceding 24-48 hours is important, but occasionally longer periods will be relevant. 6. Middle categories imply that the patient supplies over 50 per cent of the effort. 7. Use of aids to be independent is allowed. Kenyetta Harris., Barthel, DMaritaW. (0001). Functional evaluation: the Barthel Index. [facility] (14)2. MARCE Earl, Lia Figueroa., [facility] [place], [address] [place] [address] (). Measuring the change indisability after inpatient rehabilitation; comparison of the responsiveness of the Barthel Index and Functional [place] Measure. Journal of Neurology, Neurosurgery, and Psychiatry, 66(4), 788-645.   Marcos Orantes, N.J.A, ABELARDO Howard, & Julio Haskins MBETTY. (2004.) Assessment of post-stroke quality of life in cost-effectiveness studies: The usefulness of the Barthel Index and the EuroQoL-5D. Quality of Life Research, 15, 585-04         Occupational Therapy Evaluation Charge Determination   History Examination Decision-Making   LOW Complexity : Brief history review  HIGH Complexity : 5 or more performance deficits relating to physical, cognitive , or psychosocial skils that result in activity limitations and / or participation restrictions MEDIUM Complexity : Patient may present with comorbidities that affect occupational performnce. Miniml to moderate modification of tasks or assistance (eg, physical or verbal ) with assesment(s) is necessary to enable patient to complete evaluation       Based on the above components, the patient evaluation is determined to be of the following complexity level: LOW   Pain Ratin/10    Activity Tolerance:   Fair and requires rest breaks  Please refer to the flowsheet for vital signs taken during this treatment. After treatment patient left in no apparent distress:    Sitting in chair, Call bell within reach and Bed / chair alarm activated    COMMUNICATION/EDUCATION:   The patients plan of care was discussed with: Physical therapist and Registered nurse. Home safety education was provided and the patient/caregiver indicated understanding., Patient/family have participated as able in goal setting and plan of care. and Patient/family agree to work toward stated goals and plan of care. This patients plan of care is appropriate for delegation to [facility].     Thank you for this referral.  Rima Loza OT  Time Calculation: 29 mins

## 2020-06-17 NOTE — PROGRESS NOTES
Occupational Therapy Note:  Chart reviewed and spoke with nursing. Patient just returned to bed after being up this morning and she is in a great deal of pain. RN reports patient just received pain medication and recommending attempting back later today. Will follow.   Angeles Valladares, OTR/L

## 2020-06-18 ENCOUNTER — APPOINTMENT (OUTPATIENT)
Dept: NON INVASIVE DIAGNOSTICS | Age: 75
DRG: 872 | End: 2020-06-18
Attending: INTERNAL MEDICINE
Payer: MEDICARE

## 2020-06-18 LAB
ALBUMIN SERPL-MCNC: 2.6 G/DL (ref 3.5–5)
ALBUMIN/GLOB SERPL: 0.7 {RATIO} (ref 1.1–2.2)
ALP SERPL-CCNC: 98 U/L (ref 45–117)
ALT SERPL-CCNC: 74 U/L (ref 12–78)
ANION GAP SERPL CALC-SCNC: 5 MMOL/L (ref 5–15)
AST SERPL-CCNC: 49 U/L (ref 15–37)
BACTERIA SPEC CULT: ABNORMAL
BASOPHILS # BLD: 0 K/UL (ref 0–0.1)
BASOPHILS NFR BLD: 0 % (ref 0–1)
BILIRUB SERPL-MCNC: 0.4 MG/DL (ref 0.2–1)
BUN SERPL-MCNC: 19 MG/DL (ref 6–20)
BUN/CREAT SERPL: 23 (ref 12–20)
CALCIUM SERPL-MCNC: 8.4 MG/DL (ref 8.5–10.1)
CC UR VC: ABNORMAL
CHLORIDE SERPL-SCNC: 113 MMOL/L (ref 97–108)
CK SERPL-CCNC: 421 U/L (ref 26–192)
CO2 SERPL-SCNC: 25 MMOL/L (ref 21–32)
CREAT SERPL-MCNC: 0.82 MG/DL (ref 0.55–1.02)
DIFFERENTIAL METHOD BLD: ABNORMAL
ECHO AO ASC DIAM: 3.37 CM
ECHO AO ROOT DIAM: 3.51 CM
ECHO IVC SNIFF: 2.21 CM
ECHO LA MAJOR AXIS: 3.76 CM
ECHO LA TO AORTIC ROOT RATIO: 1.07
ECHO LV EDV TEICHHOLZ: 0.51 ML
ECHO LV ESV TEICHHOLZ: 0.35 ML
ECHO LV INTERNAL DIMENSION DIASTOLIC: 4.42 CM (ref 3.9–5.3)
ECHO LV INTERNAL DIMENSION SYSTOLIC: 3.78 CM
ECHO LV IVSD: 1.17 CM (ref 0.6–0.9)
ECHO LV MASS 2D: 222 G (ref 67–162)
ECHO LV MASS INDEX 2D: 90.5 G/M2 (ref 43–95)
ECHO LV POSTERIOR WALL DIASTOLIC: 1.2 CM (ref 0.6–0.9)
ECHO LVOT DIAM: 2.23 CM
ECHO PV MAX VELOCITY: 59.12 CM/S
ECHO PV PEAK GRADIENT: 1.4 MMHG
ECHO TV REGURGITANT MAX VELOCITY: 257.57 CM/S
ECHO TV REGURGITANT PEAK GRADIENT: 26.5 MMHG
EOSINOPHIL # BLD: 0 K/UL (ref 0–0.4)
EOSINOPHIL NFR BLD: 0 % (ref 0–7)
ERYTHROCYTE [DISTWIDTH] IN BLOOD BY AUTOMATED COUNT: 16.5 % (ref 11.5–14.5)
GLOBULIN SER CALC-MCNC: 3.6 G/DL (ref 2–4)
GLUCOSE SERPL-MCNC: 155 MG/DL (ref 65–100)
HCT VFR BLD AUTO: 33.7 % (ref 35–47)
HGB BLD-MCNC: 10.5 G/DL (ref 11.5–16)
IMM GRANULOCYTES # BLD AUTO: 0.2 K/UL (ref 0–0.04)
IMM GRANULOCYTES NFR BLD AUTO: 2 % (ref 0–0.5)
LVFS 2D: 14.49 %
LVSV (TEICH): 12.54 ML
LYMPHOCYTES # BLD: 1.5 K/UL (ref 0.8–3.5)
LYMPHOCYTES NFR BLD: 13 % (ref 12–49)
MAGNESIUM SERPL-MCNC: 2.2 MG/DL (ref 1.6–2.4)
MCH RBC QN AUTO: 26.1 PG (ref 26–34)
MCHC RBC AUTO-ENTMCNC: 31.2 G/DL (ref 30–36.5)
MCV RBC AUTO: 83.8 FL (ref 80–99)
MONOCYTES # BLD: 0.7 K/UL (ref 0–1)
MONOCYTES NFR BLD: 6 % (ref 5–13)
NEUTS SEG # BLD: 9.5 K/UL (ref 1.8–8)
NEUTS SEG NFR BLD: 79 % (ref 32–75)
NRBC # BLD: 0.02 K/UL (ref 0–0.01)
NRBC BLD-RTO: 0.2 PER 100 WBC
PLATELET # BLD AUTO: 360 K/UL (ref 150–400)
PMV BLD AUTO: 10.1 FL (ref 8.9–12.9)
POTASSIUM SERPL-SCNC: 3.6 MMOL/L (ref 3.5–5.1)
PROT SERPL-MCNC: 6.2 G/DL (ref 6.4–8.2)
RBC # BLD AUTO: 4.02 M/UL (ref 3.8–5.2)
SERVICE CMNT-IMP: ABNORMAL
SODIUM SERPL-SCNC: 143 MMOL/L (ref 136–145)
WBC # BLD AUTO: 12 K/UL (ref 3.6–11)

## 2020-06-18 PROCEDURE — 83735 ASSAY OF MAGNESIUM: CPT

## 2020-06-18 PROCEDURE — 74011250637 HC RX REV CODE- 250/637: Performed by: NURSE PRACTITIONER

## 2020-06-18 PROCEDURE — 80053 COMPREHEN METABOLIC PANEL: CPT

## 2020-06-18 PROCEDURE — 74011250637 HC RX REV CODE- 250/637: Performed by: STUDENT IN AN ORGANIZED HEALTH CARE EDUCATION/TRAINING PROGRAM

## 2020-06-18 PROCEDURE — 93306 TTE W/DOPPLER COMPLETE: CPT

## 2020-06-18 PROCEDURE — 65660000000 HC RM CCU STEPDOWN

## 2020-06-18 PROCEDURE — 36415 COLL VENOUS BLD VENIPUNCTURE: CPT

## 2020-06-18 PROCEDURE — 85025 COMPLETE CBC W/AUTO DIFF WBC: CPT

## 2020-06-18 PROCEDURE — 74011000258 HC RX REV CODE- 258: Performed by: INTERNAL MEDICINE

## 2020-06-18 PROCEDURE — 97116 GAIT TRAINING THERAPY: CPT

## 2020-06-18 PROCEDURE — 97530 THERAPEUTIC ACTIVITIES: CPT

## 2020-06-18 PROCEDURE — 74011250637 HC RX REV CODE- 250/637: Performed by: FAMILY MEDICINE

## 2020-06-18 PROCEDURE — 82550 ASSAY OF CK (CPK): CPT

## 2020-06-18 PROCEDURE — 74011250637 HC RX REV CODE- 250/637: Performed by: INTERNAL MEDICINE

## 2020-06-18 PROCEDURE — 74011250636 HC RX REV CODE- 250/636: Performed by: INTERNAL MEDICINE

## 2020-06-18 PROCEDURE — 97535 SELF CARE MNGMENT TRAINING: CPT

## 2020-06-18 RX ORDER — METOPROLOL TARTRATE 25 MG/1
37.5 TABLET, FILM COATED ORAL EVERY 6 HOURS
Status: DISCONTINUED | OUTPATIENT
Start: 2020-06-18 | End: 2020-06-19

## 2020-06-18 RX ORDER — POTASSIUM CHLORIDE 750 MG/1
40 TABLET, FILM COATED, EXTENDED RELEASE ORAL
Status: COMPLETED | OUTPATIENT
Start: 2020-06-18 | End: 2020-06-18

## 2020-06-18 RX ORDER — METOPROLOL TARTRATE 25 MG/1
12.5 TABLET, FILM COATED ORAL ONCE
Status: COMPLETED | OUTPATIENT
Start: 2020-06-18 | End: 2020-06-18

## 2020-06-18 RX ADMIN — METOPROLOL TARTRATE 12.5 MG: 25 TABLET, FILM COATED ORAL at 09:58

## 2020-06-18 RX ADMIN — METOPROLOL TARTRATE 25 MG: 25 TABLET, FILM COATED ORAL at 06:11

## 2020-06-18 RX ADMIN — METRONIDAZOLE 500 MG: 500 INJECTION, SOLUTION INTRAVENOUS at 17:18

## 2020-06-18 RX ADMIN — METRONIDAZOLE 500 MG: 500 INJECTION, SOLUTION INTRAVENOUS at 08:27

## 2020-06-18 RX ADMIN — POTASSIUM CHLORIDE 40 MEQ: 750 TABLET, FILM COATED, EXTENDED RELEASE ORAL at 09:58

## 2020-06-18 RX ADMIN — METOPROLOL TARTRATE 37.5 MG: 25 TABLET, FILM COATED ORAL at 11:17

## 2020-06-18 RX ADMIN — METOPROLOL TARTRATE 37.5 MG: 25 TABLET, FILM COATED ORAL at 17:18

## 2020-06-18 RX ADMIN — APIXABAN 5 MG: 5 TABLET, FILM COATED ORAL at 08:26

## 2020-06-18 RX ADMIN — APIXABAN 5 MG: 5 TABLET, FILM COATED ORAL at 17:18

## 2020-06-18 RX ADMIN — LEVOTHYROXINE SODIUM 50 MCG: 0.05 TABLET ORAL at 06:11

## 2020-06-18 RX ADMIN — PRAVASTATIN SODIUM 20 MG: 20 TABLET ORAL at 21:12

## 2020-06-18 RX ADMIN — HYDROCODONE BITARTRATE AND ACETAMINOPHEN 1 TABLET: 10; 325 TABLET ORAL at 04:50

## 2020-06-18 RX ADMIN — METRONIDAZOLE 500 MG: 500 INJECTION, SOLUTION INTRAVENOUS at 03:16

## 2020-06-18 RX ADMIN — CEFTRIAXONE 2 G: 2 INJECTION, POWDER, FOR SOLUTION INTRAMUSCULAR; INTRAVENOUS at 15:19

## 2020-06-18 RX ADMIN — GABAPENTIN 300 MG: 300 CAPSULE ORAL at 15:19

## 2020-06-18 RX ADMIN — Medication 10 ML: at 21:14

## 2020-06-18 RX ADMIN — METOPROLOL TARTRATE 37.5 MG: 25 TABLET, FILM COATED ORAL at 23:38

## 2020-06-18 RX ADMIN — PANTOPRAZOLE SODIUM 40 MG: 40 TABLET, DELAYED RELEASE ORAL at 06:11

## 2020-06-18 RX ADMIN — HYDROCODONE BITARTRATE AND ACETAMINOPHEN 1 TABLET: 10; 325 TABLET ORAL at 16:52

## 2020-06-18 RX ADMIN — GABAPENTIN 300 MG: 300 CAPSULE ORAL at 21:12

## 2020-06-18 RX ADMIN — GABAPENTIN 300 MG: 300 CAPSULE ORAL at 08:26

## 2020-06-18 RX ADMIN — SODIUM CHLORIDE, SODIUM LACTATE, POTASSIUM CHLORIDE, AND CALCIUM CHLORIDE 100 ML/HR: 600; 310; 30; 20 INJECTION, SOLUTION INTRAVENOUS at 23:40

## 2020-06-18 RX ADMIN — SODIUM CHLORIDE, SODIUM LACTATE, POTASSIUM CHLORIDE, AND CALCIUM CHLORIDE 100 ML/HR: 600; 310; 30; 20 INJECTION, SOLUTION INTRAVENOUS at 13:36

## 2020-06-18 RX ADMIN — HYDROCODONE BITARTRATE AND ACETAMINOPHEN 1 TABLET: 10; 325 TABLET ORAL at 22:39

## 2020-06-18 RX ADMIN — HYDROCODONE BITARTRATE AND ACETAMINOPHEN 1 TABLET: 10; 325 TABLET ORAL at 10:48

## 2020-06-18 RX ADMIN — Medication 10 ML: at 06:12

## 2020-06-18 NOTE — PROGRESS NOTES
PT heart rate fluctuating from 80's to 140's. Since arrival.  Phuc Verdin NP made aware. Instructed to allow Pt to get settled on floor for an hour or so and if heart rate continues over 120 to call back.

## 2020-06-18 NOTE — PROGRESS NOTES
0700- Bedside and Verbal shift change report given to A Sy Flowers (oncoming nurse) by Faizan Ortiz RN (offgoing nurse). Report included the following information SBAR, Kardex, ED Summary, Intake/Output and MAR.     6736- Orders received to transfer patient to remote tele and DC enteric isolation. TRANSFER - OUT REPORT:    Verbal report given to [facility] (name) on Jf Lazcano  being transferred to 5th floor (unit) for routine progression of care       Report consisted of patients Situation, Background, Assessment and   Recommendations(SBAR). Information from the following report(s) SBAR, Kardex, ED Summary, Intake/Output and MAR was reviewed with the receiving nurse. Lines:   Peripheral IV 06/16/20 Left Antecubital (Active)   Site Assessment Clean, dry, & intact 6/18/2020  7:31 AM   Phlebitis Assessment 0 6/18/2020  7:31 AM   Infiltration Assessment 0 6/18/2020  7:31 AM   Dressing Status Clean, dry, & intact 6/18/2020  7:31 AM   Dressing Type Transparent;Tape 6/18/2020  7:31 AM   Hub Color/Line Status Infusing 6/18/2020  7:31 AM   Action Taken Open ports on tubing capped 6/18/2020  7:31 AM   Alcohol Cap Used Yes 6/18/2020  7:31 AM        Opportunity for questions and clarification was provided.       Patient transported with:   Monitor  Tech

## 2020-06-18 NOTE — PROGRESS NOTES
Cardiology Progress Note                              [address] [facility]. [address], [place], [address] [facility]                                 Phone [phone]; Fax [phone]        2020 9:20 AM     Admit Date:           2020  Admit Diagnosis:  Sepsis ([address] [A41.9]  :          1945   MRN:          [id number]       Impression Plan/Recommendation   1. Atrial flutter RVR, atypical  2. PAF s/p maze  3. S/p RADHA ligation   4. MVR  5. Sepsis, UTI  6. Hypertension   7. CAD s/p stents to Lcx & LAD   8. Diarrhea   9. Rhabdomyolysis   10. Fall /questionable syncope   11. NSVT                         · Records reviewed   · Echo to assess for endocarditis w hx of MVR in the setting of sepsis   · V rate 120's - metoprolol succinate changed to tartrate q6h- will increase dose to 37.5 mg q6h  · Continue eliquis   · Hold norvasc and losartan d/t soft BP and will adjusting rate control meds  · Recommend 30 day monitor of d/c to assess for ventricular arrhythmia/pauses  · Replete K/echo pending                 Records from Dr Lucero Chaves reviewed  Office note 2020  Hx is CAD s/p stents to Lcx & LAD   Hx of mvr -maze and RADHA ligation 10/2019  PAF w hx of [address] 2018 0701 -  1900  In: 318.3 [P.O.:240; I.V.:78.3]  Out: -     Last 3 Recorded Weights in this Encounter    20 1400 20 0438 20 0611   Weight: 220 lb (99.8 kg) 232 lb 5.8 oz (105.4 kg) 307 lb 8 oz (139.5 kg)          1901 -  0700  In: 3694.2 [P.O.:1080; I.V.:2614.2]  Out: 1900 [Urine:1900]    SUBJECTIVE               Jory Profit denies palpitations, irregular heart beat, SOB, chest pain or LE edema.    Thinks she fainted   Feels awful              Current Facility-Administered Medications   Medication Dose Route Frequency    metoprolol tartrate (LOPRESSOR) tablet 37.5 mg  37.5 mg Oral Q6H    metoprolol tartrate (LOPRESSOR) tablet 12.5 mg  12.5 mg Oral ONCE    gabapentin (NEURONTIN) capsule 300 mg  300 mg Oral TID    HYDROcodone-acetaminophen (NORCO)  mg tablet 1 Tab  1 Tab Oral Q6H PRN    sodium chloride (NS) flush 5-40 mL  5-40 mL IntraVENous Q8H    sodium chloride (NS) flush 5-40 mL  5-40 mL IntraVENous PRN    acetaminophen (TYLENOL) tablet 650 mg  650 mg Oral Q6H PRN    naloxone (NARCAN) injection 0.4 mg  0.4 mg IntraVENous PRN    cefTRIAXone (ROCEPHIN) 2 g in 0.9% sodium chloride (MBP/ADV) 50 mL  2 g IntraVENous Q24H    apixaban (ELIQUIS) tablet 5 mg  5 mg Oral BID    pantoprazole (PROTONIX) tablet 40 mg  40 mg Oral ACB    pravastatin (PRAVACHOL) tablet 20 mg  20 mg Oral QHS    lactated Ringers infusion  100 mL/hr IntraVENous CONTINUOUS    metroNIDAZOLE (FLAGYL) IVPB premix 500 mg  500 mg IntraVENous Q8H      OBJECTIVE               Intake/Output Summary (Last 24 hours) at 6/18/2020 0920  Last data filed at 6/18/2020 0831  Gross per 24 hour   Intake 2330 ml   Output 1100 ml   Net 1230 ml       Review of Systems - History obtained from the patient AS PER  HPI    Telemetry atrial flutter v rate 90-120s  7 bts of NSVT    PHYSICAL EXAM        Visit Vitals  /69 (BP 1 Location: Right arm, BP Patient Position: At rest)   Pulse 93   Temp 97.9 °F (36.6 °C)   Resp 16   Ht 5' 8\" (1.727 m)   Wt 307 lb 8 oz (139.5 kg)   SpO2 98%   BMI 46.76 kg/m²       Gen: Well-developed, obese, in no acute distress  alert and oriented x 3  HEENT:  Pink conjunctivae, Hearing grossly normal.No scleral icterus or conjunctival, moist mucous membranes  Neck: Supple, No JVD  Resp: No accessory muscle use, Clear breath sounds, No rales or rhonchi  Card: irregular Rate,Rythm, 2/6  Murmur, no rubs or gallop. No thrills.    GI:          soft, non-tender   MSK: No cyanosis or clubbing, good capillary refill  Skin: No rashes or ulcers, ecchymosis BUE/BLE  Neuro:  Cranial nerves are grossly intact, moving all four extremities, no focal deficit, follows commands appropriately  Psych:  Good insight, oriented to person, place and time, alert, Nml Affect  LE: No edema       DATA REVIEW            Laboratory and Imaging have been reviewed by me and are notable for  Recent Labs     06/18/20 0314 06/17/20 0120 06/16/20  1406   * 1,390* 2,758*   TROIQ  --   --  <0.05     Recent Labs     06/18/20 0314 06/17/20  0120 06/16/20  1406    141 140   K 3.6 3.5 3.7   CO2 25 21 22   BUN 19 36* 47*   CREA 0.82 0.95 1.24*   * 164* 114*   PHOS  --  3.3  --    MG 2.2 2.2 2.8*   WBC 12.0* 16.0* 20.8*   HGB 10.5* 11.0* 12.2   HCT 33.7* 35.7 37.7    309 414*             Harsh Carreno NP

## 2020-06-18 NOTE — PROGRESS NOTES
[address] NP  [phone]           GI PROGRESS NOTE        NAME: David Sanchez   :  1945   MRN:  [id number]       Subjective: \"Im getting there. \"  No complaints this afternoon    Objective:   NAD      VITALS:   Last 24hrs VS reviewed since prior progress note. Most recent are:  Visit Vitals  /83   Pulse (!) 122   Temp 97.7 °F (36.5 °C)   Resp 18   Ht 5' 8\" (1.727 m)   Wt 139.5 kg (307 lb 8.7 oz)   SpO2 96%   BMI 46.76 kg/m²       Intake/Output Summary (Last 24 hours) at 2020 1456  Last data filed at 2020 0831  Gross per 24 hour   Intake 1970 ml   Output 1100 ml   Net 870 ml       PHYSICAL EXAM:  General: Alert, in no acute distress    HEENT: Anicteric sclerae. Lungs:            CTA Bilaterally. Heart:  Regular  rhythm,    Abdomen: Soft, Non distended, Non tender.  (+)Bowel sounds, no HSM  Extremities: No c/c/e  Neurologic:  CN 2-12 gi, Alert and oriented X 3. No acute neurological distress   Psych:   Good insight. Not anxious nor agitated. Lab Data Reviewed:   Recent Labs     20  0314 20  0120   WBC 12.0* 16.0*   HGB 10.5* 11.0*   HCT 33.7* 35.7    309     Recent Labs     20  0314 20  0120    141   K 3.6 3.5   * 112*   CO2 25 21   BUN 19 36*   CREA 0.82 0.95   * 164*   PHOS  --  3.3   CA 8.4* 8.6     Recent Labs     20  0314 20  0120   AP 98 118*   TP 6.2* 6.4   ALB 2.6* 2.7*   GLOB 3.6 3.7       ________________________________________________________________________  Patient Active Problem List   Diagnosis Code    Cirrhosis (HCC) K74.60    Transaminitis R74.0    Lung nodules R91.8    Atrial flutter (HCC) I48.92    HTN (hypertension) I10    Renal insufficiency N28.9    Rhabdomyolysis M62.82    SIRS (systemic inflammatory response syndrome) (HCC) R65.10    Hypothermia T68. XXXA    Rib fracture S22.39XA    Sepsis ([facility] A41.9    Hypothyroidism E03.9         Assessment and Plan:  Intra hepatic biliary dilation:  US from yesterday shows no ovvert findings of cirrhosis, CBD dilated to 10mm, and mild intrahepatic ductal dilation. LFTs have been downtrending. Hepatitis Panel is negative. - Continue to monitor CMP  - Avoid Hepatotoxic medications  - Supportive care  - Follow up as outpatient and MRCP can be arranged at that time. Will see again on request.  Please have her follow up with us after her discharge.         Signed By: Renetta Bowens NP     6/18/2020  2:56 PM

## 2020-06-18 NOTE — PROGRESS NOTES
Daily Progress Note: 6/18/2020  Keith Maldonado MD    Assessment/Plan:    SIRS (systemic inflammatory response syndrome): possible sepsis with 4/4 SIRS (hypothermia likely environmental as patient was found in her yard, leukocytosis, tachycardia, tachypnea). Likely from UTI. Not severe sepsis. Lactate WNR. Also noting diarrhea. Send C. Diff. IV CTX and IV Flagyl for now, pending stool studies and urine /blood cultures. Cont IVF; monitor for volume overload    UTI  - gram neg compa in urine and blood  - cont to cover with Rocephin and Flagyl for now - awaiting identification.      Fall: found down in her yard by neighbor, soiled in her own urine and feces due to inability to get up. Check echo, monitor on tele. PT/OT. Likely needs placement. CM consult       Atrial flutter with RVR: HR expected to decrease with treatment as above; monitor closely for need to start IV diltiazem gtt. Continue metoprolol and Eliquis. Cardiology consulted. TTE       Cirrhosis: with elevated liver enzymes which may be related to rhabdo. Denies ETOH use. GI consult       Renal insufficiency / Rhabdomyolysis: continue IVF. Follow BMP. Repeat CK       Rib fracture: pain control, incentive spirometer       Lung nodules: will need outpatient follow up       Hypothyroidism: TSH low. Other than tachycardia does not appear hyperthyroid. Add on FT4.  Cont Synthroid for now, pending FT4       HTN (hypertension): BP normal. Cont metoprolol otherwise hold other antihypertensives      Code Status: FULL     Problem List:  Problem List as of 6/18/2020 Date Reviewed: 6/16/2020          Codes Class Noted - Resolved    Cirrhosis ([facility] ICD-10-CM: K74.60  ICD-9-CM: 571.5  6/16/2020 - Present        Transaminitis ICD-10-CM: R74.0  ICD-9-CM: 790.4  6/16/2020 - Present        Lung nodules ICD-10-CM: R91.8  ICD-9-CM: 793.19  6/16/2020 - Present        Atrial flutter ([facility] ICD-10-CM: S94.33  ICD-9-CM: 427.32  6/16/2020 - Present        HTN (hypertension) ICD-10-CM: I10  ICD-9-CM: 401.9  6/16/2020 - Present        Renal insufficiency ICD-10-CM: N28.9  ICD-9-CM: 593.9  6/16/2020 - Present        Rhabdomyolysis ICD-10-CM: M62.82  ICD-9-CM: 728.88  6/16/2020 - Present        SIRS (systemic inflammatory response syndrome) (HCC) ICD-10-CM: R65.10  ICD-9-CM: 995.90  6/16/2020 - Present        Hypothermia ICD-10-CM: T68. Vieyra Barrio  ICD-9-CM: 991.6  6/16/2020 - Present        Rib fracture ICD-10-CM: S22.39XA  ICD-9-CM: 807.00  6/16/2020 - Present        Sepsis ([address] ICD-10-CM: A41.9  ICD-9-CM: 038.9, 995.91  6/16/2020 - Present        Hypothyroidism ICD-10-CM: E03.9  ICD-9-CM: 244.9  6/16/2020 - Present            Subjective:    75 y.o. female w/ hx of AF, HTN who presents with fall. Patient was found down in her yard by her neighbor, after lying there for 2 days. She could not provide details of the fall, but does report that she was too weak to get up to get help. She does report recent diarrhea, urinary incontinence however otherwise denies fevers of chills, abdominal pain, n/v, CP, SOB, cough, HA. She thought she had dizziness. ED workup showed SIRS criteria, UTI, elevated liver enzymes, and elevated CK. Ms. Raymond Wilson is admitted for further evaluation and management. (Dr Whyte January)    6/17:  Still having mult loose stools and episodic crampy ab pain. C diff pending when able to collect - nurses report majority of her stools are semi-soft to loose. Her major complaint is low back pain which is chronic. BPs better today so can restart her usual pain meds. Looks like she is in and out of A flutter - Cards to see. CK 1300. WBC better at 16K.      6/18:  Fewer stools and stools are soft. Urine and blood with gram neg rods - continuing Rocephin and Flagyl for now. WBC better at 12K. Afeb. CK down to 421. Cards has seen.       Review of Systems:   A comprehensive review of systems was negative except for that written in the HPI.     Objective:   Physical Exam: Visit Vitals  /73 (BP 1 Location: Right arm, BP Patient Position: At rest)   Pulse 98   Temp 98.2 °F (36.8 °C)   Resp 22   Ht 5' 8\" (1.727 m)   Wt 139.5 kg (307 lb 8 oz)   SpO2 98%   BMI 46.76 kg/m²      O2 Device: Room air  Temp (24hrs), Av.2 °F (36.8 °C), Min:97.6 °F (36.4 °C), Max:98.7 °F (37.1 °C)    No intake/output data recorded. 1901 -  0700  In: 3694.2 [P.O.:1080; I.V.:2614.2]  Out: 1900 [Urine:1900]  General:  Alert, cooperative, elderly, obese, no distress, appears stated age. Head:  Normocephalic, without obvious abnormality, atraumatic. Eyes:  Conjunctivae/corneas clear. EOMs intact. Throat: Lips, mucosa, and tongue moist..   Neck: Supple, symmetrical, trachea midline, no adenopathy, thyroid: no enlargement/tenderness/nodules, no carotid bruit and no JVD. Lungs:   Clear to auscultation bilaterally. Chest wall:  No tenderness or deformity. Heart:  Irregular, rapid at times. no murmur, click, rub or gallop. Abdomen:   Soft, with mild generalized tenderness. No R/G. Bowel sounds active. No masses,  No organomegaly. Extremities: no cyanosis or edema. No calf tenderness or cords. Pulses: 2+ and symmetric all extremities. Skin: Skin color, texture, turgor normal. No rashes   Neurologic: CNII-XII intact. Alert and oriented X 3.  equal.  No cogwheeling or rigidity. Gait not tested at this time. Sensation grossly normal to touch. Gross motor of extremities normal.       Data Review:     LIMITED ULTRASOUND OF THE ABDOMEN   20  INDICATION: Cirrhosis with common bile duct dilation  COMPARISON: CT chest abdomen pelvis 2020. TECHNIQUE:  Limited sonography of the abdomen was performed. FINDINGS:  LIVER: Mild intrahepatic biliary ductal dilation. No overt cirrhosis. The liver  is incompletely imaged. No mass is shown in the imaged portions. MAIN PORTAL VEIN: Patent. Appropriate hepatopetal flow. GALLBLADDER: Surgically absent.   COMMON DUCT: 10 mm in diameter. Dilated. PANCREAS: A small portion of the visualized proximal pancreas is normal; the  remainder is obscured. SPLEEN: Not shown. RIGHT KIDNEY: 10.8 cm in length. No hydronephrosis, shadowing calculus, or  contour-deforming renal mass. LEFT KIDNEY: Not shown. AORTA: Normal caliber in its visualized portions. INFERIOR VENA CAVA: Normal caliber in its visualized portions. IMPRESSION:   Biliary ductal dilation. CT Chest/Ab/Pelvis 6/16/20  INDICATION: Tachypnea, cough, ground-level fall 2 days ago, hypothermia. Back  and left hip pain  COMPARISON: None  CONTRAST: None.   FINDINGS:  CHEST WALL: No mass or axillary lymphadenopathy. THYROID: No nodule. MEDIASTINUM: No mass or lymphadenopathy. GELA: No mass or lymphadenopathy. THORACIC AORTA: No aneurysm. MAIN PULMONARY ARTERY: Normal in caliber. TRACHEA/BRONCHI: Patent. ESOPHAGUS: No wall thickening or dilatation. HEART: Normal in size. PLEURA: No effusion or pneumothorax. LUNGS: 5 mm right upper lobe nodule (series 2, image 15). 2 mm left upper lobe  nodule (image 13). 2 mm right upper lobe nodule (series 2, image 29). The absence of intravenous contrast material reduces the sensitivity for  evaluation of the solid parenchymal organs of the abdomen. No focal  abnormalities are seen within the liver, spleen, pancreas or adrenal glands or  kidneys. No renal or ureteral calculus or obstruction is identified. Cholecystectomy clips are present. There is a left renal 5.5 cm cyst with  Hounsfield unit measurement of 6.4 (axial image 71). The liver is macrolobulated  with mild central biliary prominence. The aorta is atherosclerotic but tapers without aneurysm. There is no  retroperitoneal adenopathy or mass. The bowel is not obstructed. There are multiple sigmoid diverticula. The  appendix is normal on axial image 101. There is no ascites or free  intraperitoneal air. The uterus is small. There is no pelvic mass or adenopathy.   The right total hip replacement in place creates a moderate amount of streak  artifact. There is disc space narrowing at multiple lumbar levels. There is an acute left lateral 11th rib fracture (sagittal image 115)  IMPRESSION:   Pulmonary micronodules  Incidental sigmoid diverticulosis. Cirrhosis with mild intrahepatic biliary dilatation. EXAM:  CT CERVICAL SPINE WITHOUT CONTRAST 6/16/20  INDICATION: Limited ROM, pain, GLF.  COMPARISON: None. CONTRAST:  None.   FINDINGS:  There is no acute fracture or dislocation. C5-6 and C6-7 show some prominent  change of disc degeneration and spondylosis of.  Prominent facet hypertrophy seen on the right at C3-C4. On the left at C4-C5. Bilateral foraminal narrowing seen at C5-6 and C6-7. Mild canal stenosis seen at  those 2 level by spur. IMPRESSION: Prominent spondylosis, no acute findings seen. Recent Days:  Recent Labs     06/18/20 0314 06/17/20  0120 06/16/20  1406   WBC 12.0* 16.0* 20.8*   HGB 10.5* 11.0* 12.2   HCT 33.7* 35.7 37.7    309 414*     Recent Labs     06/18/20  0314 06/17/20  0120 06/16/20  1406    141 140   K 3.6 3.5 3.7   * 112* 106   CO2 25 21 22   * 164* 114*   BUN 19 36* 47*   CREA 0.82 0.95 1.24*   CA 8.4* 8.6 8.9   MG 2.2 2.2 2.8*   PHOS  --  3.3  --    ALB 2.6* 2.7* 3.2*   TBILI 0.4 0.4 0.8   ALT 74 88* 107*     No results for input(s): PH, PCO2, PO2, HCO3, FIO2 in the last 72 hours. 24 Hour Results:  Recent Results (from the past 24 hour(s))   HEPATITIS PANEL, ACUTE    Collection Time: 06/17/20  4:27 PM   Result Value Ref Range    Hepatitis A, IgM NONREACTIVE NR      __          Hepatitis B surface Ag 0.25 Index    Hep B surface Ag Interp. Negative NEG      __          Hepatitis B core, IgM NONREACTIVE NR      __          Hep C  virus Ab Interp.  NONREACTIVE NR      Hep C  virus Ab comment Method used is Siemens Advia Freebaseaur     METABOLIC PANEL, COMPREHENSIVE    Collection Time: 06/18/20  3:14 AM   Result Value Ref Range    Sodium 143 136 - 145 mmol/L    Potassium 3.6 3.5 - 5.1 mmol/L    Chloride 113 (H) 97 - 108 mmol/L    CO2 25 21 - 32 mmol/L    Anion gap 5 5 - 15 mmol/L    Glucose 155 (H) 65 - 100 mg/dL    BUN 19 6 - 20 MG/DL    Creatinine 0.82 0.55 - 1.02 MG/DL    BUN/Creatinine ratio 23 (H) 12 - 20      GFR est AA >60 >60 ml/min/1.73m2    GFR est non-AA >60 >60 ml/min/1.73m2    Calcium 8.4 (L) 8.5 - 10.1 MG/DL    Bilirubin, total 0.4 0.2 - 1.0 MG/DL    ALT (SGPT) 74 12 - 78 U/L    AST (SGOT) 49 (H) 15 - 37 U/L    Alk. phosphatase 98 45 - 117 U/L    Protein, total 6.2 (L) 6.4 - 8.2 g/dL    Albumin 2.6 (L) 3.5 - 5.0 g/dL    Globulin 3.6 2.0 - 4.0 g/dL    A-G Ratio 0.7 (L) 1.1 - 2.2     CBC WITH AUTOMATED DIFF    Collection Time: 06/18/20  3:14 AM   Result Value Ref Range    WBC 12.0 (H) 3.6 - 11.0 K/uL    RBC 4.02 3.80 - 5.20 M/uL    HGB 10.5 (L) 11.5 - 16.0 g/dL    HCT 33.7 (L) 35.0 - 47.0 %    MCV 83.8 80.0 - 99.0 FL    MCH 26.1 26.0 - 34.0 PG    MCHC 31.2 30.0 - 36.5 g/dL    RDW 16.5 (H) 11.5 - 14.5 %    PLATELET 825 966 - 680 K/uL    MPV 10.1 8.9 - 12.9 FL    NRBC 0.2 (H) 0  WBC    ABSOLUTE NRBC 0.02 (H) 0.00 - 0.01 K/uL    NEUTROPHILS 79 (H) 32 - 75 %    LYMPHOCYTES 13 12 - 49 %    MONOCYTES 6 5 - 13 %    EOSINOPHILS 0 0 - 7 %    BASOPHILS 0 0 - 1 %    IMMATURE GRANULOCYTES 2 (H) 0.0 - 0.5 %    ABS. NEUTROPHILS 9.5 (H) 1.8 - 8.0 K/UL    ABS. LYMPHOCYTES 1.5 0.8 - 3.5 K/UL    ABS. MONOCYTES 0.7 0.0 - 1.0 K/UL    ABS. EOSINOPHILS 0.0 0.0 - 0.4 K/UL    ABS. BASOPHILS 0.0 0.0 - 0.1 K/UL    ABS. IMM.  GRANS. 0.2 (H) 0.00 - 0.04 K/UL    DF AUTOMATED     CK    Collection Time: 06/18/20  3:14 AM   Result Value Ref Range     (H) 26 - 192 U/L   MAGNESIUM    Collection Time: 06/18/20  3:14 AM   Result Value Ref Range    Magnesium 2.2 1.6 - 2.4 mg/dL       Medications reviewed  Current Facility-Administered Medications   Medication Dose Route Frequency    gabapentin (NEURONTIN) capsule 300 mg  300 mg Oral TID    HYDROcodone-acetaminophen (NORCO)  mg tablet 1 Tab  1 Tab Oral Q6H PRN    metoprolol tartrate (LOPRESSOR) tablet 25 mg  25 mg Oral Q6H    sodium chloride (NS) flush 5-40 mL  5-40 mL IntraVENous Q8H    sodium chloride (NS) flush 5-40 mL  5-40 mL IntraVENous PRN    acetaminophen (TYLENOL) tablet 650 mg  650 mg Oral Q6H PRN    naloxone (NARCAN) injection 0.4 mg  0.4 mg IntraVENous PRN    cefTRIAXone (ROCEPHIN) 2 g in 0.9% sodium chloride (MBP/ADV) 50 mL  2 g IntraVENous Q24H    apixaban (ELIQUIS) tablet 5 mg  5 mg Oral BID    levothyroxine (SYNTHROID) tablet 50 mcg  50 mcg Oral ACB    pantoprazole (PROTONIX) tablet 40 mg  40 mg Oral ACB    pravastatin (PRAVACHOL) tablet 20 mg  20 mg Oral QHS    lactated Ringers infusion  100 mL/hr IntraVENous CONTINUOUS    metroNIDAZOLE (FLAGYL) IVPB premix 500 mg  500 mg IntraVENous Q8H       Care Plan discussed with: Patient and Nurse    Total time spent with patient: 30 minutes.     Alessio Kruger MD

## 2020-06-18 NOTE — PROGRESS NOTES
Bedside and Verbal shift change report given to Salome Elam (oncoming nurse) by Mendel Kansas (offgoing nurse). Report included the following information SBAR, Kardex, Intake/Output, MAR, Recent Results and Cardiac Rhythm A flutter.

## 2020-06-18 NOTE — PROGRESS NOTES
Problem: Falls - Risk of  Goal: *Absence of Falls  Description: Document Lauri Bowman Fall Risk and appropriate interventions in the flowsheet. Outcome: Progressing Towards Goal  Note: Fall Risk Interventions:  Mobility Interventions: Bed/chair exit alarm, Patient to call before getting OOB         Medication Interventions: Bed/chair exit alarm, Patient to call before getting OOB, Teach patient to arise slowly    Elimination Interventions: Bed/chair exit alarm, Call light in reach, Patient to call for help with toileting needs, Stay With Me (per policy)    History of Falls Interventions: Bed/chair exit alarm, Investigate reason for fall, Room close to nurse's station         Problem: Patient Education: Go to Patient Education Activity  Goal: Patient/Family Education  Outcome: Progressing Towards Goal     Problem: Risk for Spread of Infection  Goal: Prevent transmission of infectious organism to others  Description: Prevent the transmission of infectious organisms to other patients, staff members, and visitors.   Outcome: Progressing Towards Goal     Problem: Patient Education:  Go to Education Activity  Goal: Patient/Family Education  Outcome: Progressing Towards Goal     Problem: General Medical Care Plan  Goal: *Vital signs within specified parameters  Outcome: Progressing Towards Goal  Goal: *Labs within defined limits  Outcome: Progressing Towards Goal  Goal: *Absence of infection signs and symptoms  Outcome: Progressing Towards Goal  Goal: *Optimal pain control at patient's stated goal  Outcome: Progressing Towards Goal  Goal: *Skin integrity maintained  Outcome: Progressing Towards Goal  Goal: *Fluid volume balance  Outcome: Progressing Towards Goal  Goal: *Optimize nutritional status  Outcome: Progressing Towards Goal  Goal: *Anxiety reduced or absent  Outcome: Progressing Towards Goal  Goal: *Progressive mobility and function (eg: ADL's)  Outcome: Progressing Towards Goal     Problem: Patient Education: Go to Patient Education Activity  Goal: Patient/Family Education  Outcome: Progressing Towards Goal     Problem: Pressure Injury - Risk of  Goal: *Prevention of pressure injury  Description: Document Matthew Scale and appropriate interventions in the flowsheet.   Outcome: Progressing Towards Goal  Note: Pressure Injury Interventions:       Moisture Interventions: Moisture barrier, Assess need for specialty bed    Activity Interventions: Assess need for specialty bed, Increase time out of bed, PT/OT evaluation    Mobility Interventions: Assess need for specialty bed, HOB 30 degrees or less, PT/OT evaluation    Nutrition Interventions: Document food/fluid/supplement intake, Discuss nutritional consult with provider, Offer support with meals,snacks and hydration    Friction and Shear Interventions: Apply protective barrier, creams and emollients, Lift sheet, Lift team/patient mobility team, Minimize layers                Problem: Patient Education: Go to Patient Education Activity  Goal: Patient/Family Education  Outcome: Progressing Towards Goal     Problem: Patient Education: Go to Patient Education Activity  Goal: Patient/Family Education  Outcome: Progressing Towards Goal     Problem: Patient Education: Go to Patient Education Activity  Goal: Patient/Family Education  Outcome: Progressing Towards Goal

## 2020-06-18 NOTE — PROGRESS NOTES
Problem: Self Care Deficits Care Plan (Adult)  Goal: *Acute Goals and Plan of Care (Insert Text)  Description:   FUNCTIONAL STATUS PRIOR TO ADMISSION: Patient was modified independent using a rollator for functional mobility. HOME SUPPORT: The patient lived alone with good neighbors, friends and family local.    Occupational Therapy Goals  Initiated 6/17/2020  1. Patient will perform grooming standing at sink with supervision/set-up within 7 day(s). 2.  Patient will perform lower body dressing using adaptive equipment with supervision/set-up within 7 day(s). 3.  Patient will perform toilet transfers with supervision/set-up using rollator within 7 day(s). 4.  Patient will perform all aspects of toileting with supervision/set-up within 7 day(s). 5.  Patient will participate in upper extremity therapeutic exercise/activities with modified independence for 10 minutes within 7 day(s). 6.  Patient will utilize energy conservation techniques during functional activities with verbal and visual cues within 7 day(s). Outcome: Progressing Towards Goal   OCCUPATIONAL THERAPY TREATMENT  Patient: Nadege Mendoza (4 y.o. female)  Date: 6/18/2020  Diagnosis: Sepsis ([facility] [address] [A41.9]   <principal problem not specified>       Precautions: Fall, Contact  Chart, occupational therapy assessment, plan of care, and goals were reviewed. ASSESSMENT  Patient continues with skilled OT services and is progressing towards goals, demonstrating increased activity tolerance today. Pt received in bed, agreeable to participate. Required mod A to move LEs OOB due to weakness and L hip pain. Using RW pt ambulated to bathroom and performed toilet transfer, slow pace and short step length, no LOB observed. Performed toileting with supervision-CGA before transferring to chair in room. Pt fatigued after activity, c/o pain \"all over\" during session but particularly in L hip and L ribs.  Vitals monitored and stable, pts' HR 75 bpm at rest and up to 105 bpm after activity, 93 bpm at end of session. Pt is motivated to improve her functional performance, is modified independent at baseline. Current Level of Function Impacting Discharge (ADLs): Min A short distance transfers, min-total A LB ADLs, independent-min A UB ADLs    Other factors to consider for discharge: Pt lives alone but reports her children can stay with her         PLAN :  Patient continues to benefit from skilled intervention to address the above impairments. Continue treatment per established plan of care. to address goals. Recommend with staff: OOB to chair for all meals    Recommend next OT session: standing grooming ADLs, LB dressing    Recommendation for discharge: (in order for the patient to meet his/her long term goals)  Therapy 3 hours per day 5-7 days per week vs home with [facility] and 24/7 supervision/assistance    This discharge recommendation:  Has been made in collaboration with the attending provider and/or case management    IF patient discharges home will need the following DME: none, pt has needed DME       SUBJECTIVE:   Patient stated I don't think I can stand at the sink yet.     OBJECTIVE DATA SUMMARY:   Cognitive/Behavioral Status:  Neurologic State: Alert  Orientation Level: Oriented X4  Cognition: Follows commands  Perception: Appears intact  Perseveration: No perseveration noted  Safety/Judgement: Awareness of environment; Fall prevention    Functional Mobility and Transfers for ADLs:  Bed Mobility:  Supine to Sit: Moderate assistance(to move LEs)    Transfers:  Sit to Stand: Minimum assistance; Additional time; Adaptive equipment(RW)  Functional Transfers  Toilet Transfer : Minimum assistance; Adaptive equipment; Additional time(to toilet with BSC frame overtop)  Bed to Chair: Minimum assistance; Additional time; Adaptive equipment    Balance:  Sitting: Intact  Standing: Intact; With support(RW)    ADL Intervention:  Feeding  Feeding Assistance: Independent    Grooming  Washing Hands: Set-up(pt used  wipe in standing, declined to amb to sink )         Toileting  Bladder Hygiene: Supervision    Cognitive Retraining  Safety/Judgement: Awareness of environment; Fall prevention    Pain:  Pt did not rate but c/o pain in L hip and L ribs    Activity Tolerance:   Fair  Please refer to the flowsheet for vital signs taken during this treatment. After treatment patient left in no apparent distress:   Sitting in chair, Call bell within reach, and Bed / chair alarm activated    COMMUNICATION/COLLABORATION:   The patients plan of care was discussed with: Physical therapist and Registered nurse.      Anushka Wills OT  Time Calculation: 26 mins

## 2020-06-18 NOTE — PROGRESS NOTES
Problem: Mobility Impaired (Adult and Pediatric)  Goal: *Acute Goals and Plan of Care (Insert Text)  Description: FUNCTIONAL STATUS PRIOR TO ADMISSION: Patient was modified independent using a rollator for functional mobility. HOME SUPPORT PRIOR TO ADMISSION: The patient lived alone with neighbors to provide assistance. Physical Therapy Goals  Initiated 6/17/2020  1. Patient will move from supine to sit and sit to supine  in bed with independence within 7 day(s). 2.  Patient will transfer from bed to chair and chair to bed with independence using the least restrictive device within 7 day(s). 3.  Patient will perform sit to stand with modified independence within 7 day(s). 4.  Patient will ambulate with modified independence for 100 feet with the least restrictive device within 7 day(s). 5.  Patient will ascend/descend 3 stairs with 2 handrail(s) with modified independence within 7 day(s). Outcome: Progressing Towards Goal   PHYSICAL THERAPY TREATMENT  Patient: Tyesha Richards (83 y.o. female)  Date: 6/18/2020  Diagnosis: [facility] [A41.9]   <principal problem not specified>       Precautions: Fall, Contact  Chart, physical therapy assessment, plan of care and goals were reviewed. ASSESSMENT  Patient continues with skilled PT services and is progressing towards goals. Sit on the edge of bed mod assist, sit to stand min assist, ambulate with rolling walker and rollator walker in the room and towards the bathroom min assist. Sitting and standing balance fair. OOB to chair as tolerated performed some active range of motion exercise on both LE all planes. Educate and  Instructed patient to continue active range of motion exercise on both legs while up on chair or on bed. Communicated with nurse who agreed to monitor patient.      Current Level of Function Impacting Discharge (mobility/balance): mod assist with bed mobility, min assist with transfers and ambulation using a rolling walker or rollator walker    Other factors to consider for discharge: fall and pain         PLAN :  Patient continues to benefit from skilled intervention to address the above impairments. Continue treatment per established plan of care. to address goals. Recommendation for discharge: (in order for the patient to meet his/her long term goals)  Therapy up to 5 days/week in SNF setting or intensive home health therapy program    This discharge recommendation:  Has been made in collaboration with the attending provider and/or case management    IF patient discharges home will need the following DME: patient owns DME required for discharge       SUBJECTIVE:   Patient stated ok I want to sit up on the chair.     OBJECTIVE DATA SUMMARY:   Critical Behavior:  Neurologic State: Alert  Orientation Level: Oriented X4  Cognition: Follows commands  Safety/Judgement: Awareness of environment, Fall prevention  Functional Mobility Training:  Bed Mobility:  Rolling: Moderate assistance  Supine to Sit: Moderate assistance(to move LEs)  Sit to Supine: Moderate assistance  Scooting: Moderate assistance        Transfers:  Sit to Stand: Minimum assistance; Additional time; Adaptive equipment(RW)  Stand to Sit: Minimum assistance  Stand Pivot Transfers: Minimum assistance     Bed to Chair: Minimum assistance; Additional time; Adaptive equipment                    Balance:  Sitting: Intact  Standing: Intact; With support(RW)  Ambulation/Gait Training:  Distance (ft): 50 Feet (ft)  Assistive Device: Walker, rolling;Walker, rollator;Gait belt  Ambulation - Level of Assistance: Minimal assistance     Gait Description (WDL): Exceptions to WDL  Gait Abnormalities: Antalgic; Path deviations        Base of Support: Widened     Speed/Marlyn: Pace decreased (<100 feet/min)  Step Length: Right shortened;Left shortened                Therapeutic Exercises:    Instructed patient to continue active range of motion exercise on both legs while up on chair or on bed.    Pain Ratin/10    Activity Tolerance:   Good  Please refer to the flowsheet for vital signs taken during this treatment. After treatment patient left in no apparent distress:   Sitting in chair, Heels elevated for pressure relief, Call bell within reach, and Bed / chair alarm activated    COMMUNICATION/COLLABORATION:   The patients plan of care was discussed with: Occupational therapist and Registered nurse. Michelle Wilson, PT,WCC.    Time Calculation: 24 mins

## 2020-06-18 NOTE — PROGRESS NOTES
TRANSFER - OUT REPORT:    Verbal report given to [facility]) on Darl Clayton (patient name)  being transferred to [facility](unit) for routine progression of care   Report consisted of patients Situation, Background, Assessment and   Recommendations(SBAR).      Rashida Guidry, RN, MSN/Care manager  [phone]

## 2020-06-18 NOTE — ROUTINE PROCESS
0410 
Received call from [employer] that patient had a 7 beat run of wide V tach. [id number] Notified Dr. Serafin Barron. No new orders at this time. 0700 Bedside and Verbal shift change report given to Art Verma (oncoming nurse) by Luis Tilley (offgoing nurse). Report included the following information SBAR, Kardex, ED Summary, Procedure Summary, Intake/Output, MAR, Recent Results and Cardiac Rhythm A Fib.

## 2020-06-19 LAB
ALBUMIN SERPL-MCNC: 2.8 G/DL (ref 3.5–5)
ALBUMIN/GLOB SERPL: 0.8 {RATIO} (ref 1.1–2.2)
ALP SERPL-CCNC: 91 U/L (ref 45–117)
ALT SERPL-CCNC: 71 U/L (ref 12–78)
ANION GAP SERPL CALC-SCNC: 4 MMOL/L (ref 5–15)
AST SERPL-CCNC: 37 U/L (ref 15–37)
BASOPHILS # BLD: 0 K/UL (ref 0–0.1)
BASOPHILS NFR BLD: 0 % (ref 0–1)
BILIRUB SERPL-MCNC: 0.4 MG/DL (ref 0.2–1)
BUN SERPL-MCNC: 15 MG/DL (ref 6–20)
BUN/CREAT SERPL: 16 (ref 12–20)
CALCIUM SERPL-MCNC: 8.8 MG/DL (ref 8.5–10.1)
CHLORIDE SERPL-SCNC: 111 MMOL/L (ref 97–108)
CK SERPL-CCNC: 259 U/L (ref 26–192)
CO2 SERPL-SCNC: 28 MMOL/L (ref 21–32)
CREAT SERPL-MCNC: 0.94 MG/DL (ref 0.55–1.02)
DIFFERENTIAL METHOD BLD: ABNORMAL
EOSINOPHIL # BLD: 0 K/UL (ref 0–0.4)
EOSINOPHIL NFR BLD: 0 % (ref 0–7)
ERYTHROCYTE [DISTWIDTH] IN BLOOD BY AUTOMATED COUNT: 16.6 % (ref 11.5–14.5)
ERYTHROCYTE [SEDIMENTATION RATE] IN BLOOD: 50 MM/HR (ref 0–30)
GLOBULIN SER CALC-MCNC: 3.5 G/DL (ref 2–4)
GLUCOSE SERPL-MCNC: 141 MG/DL (ref 65–100)
HCT VFR BLD AUTO: 34.4 % (ref 35–47)
HGB BLD-MCNC: 10.6 G/DL (ref 11.5–16)
IMM GRANULOCYTES # BLD AUTO: 0 K/UL
IMM GRANULOCYTES NFR BLD AUTO: 0 %
LYMPHOCYTES # BLD: 2.3 K/UL (ref 0.8–3.5)
LYMPHOCYTES NFR BLD: 18 % (ref 12–49)
MCH RBC QN AUTO: 26.1 PG (ref 26–34)
MCHC RBC AUTO-ENTMCNC: 30.8 G/DL (ref 30–36.5)
MCV RBC AUTO: 84.7 FL (ref 80–99)
METAMYELOCYTES NFR BLD MANUAL: 1 %
MONOCYTES # BLD: 0.5 K/UL (ref 0–1)
MONOCYTES NFR BLD: 4 % (ref 5–13)
NEUTS BAND NFR BLD MANUAL: 1 % (ref 0–6)
NEUTS SEG # BLD: 9.8 K/UL (ref 1.8–8)
NEUTS SEG NFR BLD: 76 % (ref 32–75)
NRBC # BLD: 0.04 K/UL (ref 0–0.01)
NRBC BLD-RTO: 0.3 PER 100 WBC
PLATELET # BLD AUTO: 392 K/UL (ref 150–400)
PMV BLD AUTO: 9.8 FL (ref 8.9–12.9)
POTASSIUM SERPL-SCNC: 4 MMOL/L (ref 3.5–5.1)
PROT SERPL-MCNC: 6.3 G/DL (ref 6.4–8.2)
RBC # BLD AUTO: 4.06 M/UL (ref 3.8–5.2)
RBC MORPH BLD: ABNORMAL
SODIUM SERPL-SCNC: 143 MMOL/L (ref 136–145)
WBC # BLD AUTO: 12.7 K/UL (ref 3.6–11)
WBC MORPH BLD: ABNORMAL

## 2020-06-19 PROCEDURE — 74011250637 HC RX REV CODE- 250/637: Performed by: FAMILY MEDICINE

## 2020-06-19 PROCEDURE — 65660000000 HC RM CCU STEPDOWN

## 2020-06-19 PROCEDURE — 82550 ASSAY OF CK (CPK): CPT

## 2020-06-19 PROCEDURE — 74011250636 HC RX REV CODE- 250/636: Performed by: INTERNAL MEDICINE

## 2020-06-19 PROCEDURE — 36415 COLL VENOUS BLD VENIPUNCTURE: CPT

## 2020-06-19 PROCEDURE — 74011250637 HC RX REV CODE- 250/637: Performed by: INTERNAL MEDICINE

## 2020-06-19 PROCEDURE — 74011250637 HC RX REV CODE- 250/637: Performed by: NURSE PRACTITIONER

## 2020-06-19 PROCEDURE — 74011250637 HC RX REV CODE- 250/637: Performed by: STUDENT IN AN ORGANIZED HEALTH CARE EDUCATION/TRAINING PROGRAM

## 2020-06-19 PROCEDURE — 85025 COMPLETE CBC W/AUTO DIFF WBC: CPT

## 2020-06-19 PROCEDURE — 74011000258 HC RX REV CODE- 258: Performed by: INTERNAL MEDICINE

## 2020-06-19 PROCEDURE — 97116 GAIT TRAINING THERAPY: CPT

## 2020-06-19 PROCEDURE — 97530 THERAPEUTIC ACTIVITIES: CPT

## 2020-06-19 PROCEDURE — 85652 RBC SED RATE AUTOMATED: CPT

## 2020-06-19 PROCEDURE — 80053 COMPREHEN METABOLIC PANEL: CPT

## 2020-06-19 RX ORDER — METOPROLOL TARTRATE 50 MG/1
50 TABLET ORAL EVERY 6 HOURS
Status: DISCONTINUED | OUTPATIENT
Start: 2020-06-19 | End: 2020-06-23 | Stop reason: HOSPADM

## 2020-06-19 RX ADMIN — HYDROCODONE BITARTRATE AND ACETAMINOPHEN 1 TABLET: 10; 325 TABLET ORAL at 21:44

## 2020-06-19 RX ADMIN — PANTOPRAZOLE SODIUM 40 MG: 40 TABLET, DELAYED RELEASE ORAL at 06:29

## 2020-06-19 RX ADMIN — GABAPENTIN 300 MG: 300 CAPSULE ORAL at 21:31

## 2020-06-19 RX ADMIN — APIXABAN 5 MG: 5 TABLET, FILM COATED ORAL at 17:21

## 2020-06-19 RX ADMIN — GABAPENTIN 300 MG: 300 CAPSULE ORAL at 17:12

## 2020-06-19 RX ADMIN — HYDROCODONE BITARTRATE AND ACETAMINOPHEN 1 TABLET: 10; 325 TABLET ORAL at 09:46

## 2020-06-19 RX ADMIN — GABAPENTIN 300 MG: 300 CAPSULE ORAL at 09:46

## 2020-06-19 RX ADMIN — Medication 10 ML: at 06:34

## 2020-06-19 RX ADMIN — Medication 10 ML: at 13:33

## 2020-06-19 RX ADMIN — METOPROLOL TARTRATE 50 MG: 50 TABLET, FILM COATED ORAL at 13:33

## 2020-06-19 RX ADMIN — APIXABAN 5 MG: 5 TABLET, FILM COATED ORAL at 09:46

## 2020-06-19 RX ADMIN — METOPROLOL TARTRATE 50 MG: 50 TABLET, FILM COATED ORAL at 17:21

## 2020-06-19 RX ADMIN — HYDROCODONE BITARTRATE AND ACETAMINOPHEN 1 TABLET: 10; 325 TABLET ORAL at 17:12

## 2020-06-19 RX ADMIN — METOPROLOL TARTRATE 37.5 MG: 25 TABLET, FILM COATED ORAL at 06:29

## 2020-06-19 RX ADMIN — PRAVASTATIN SODIUM 20 MG: 20 TABLET ORAL at 21:31

## 2020-06-19 RX ADMIN — HYDROCODONE BITARTRATE AND ACETAMINOPHEN 1 TABLET: 10; 325 TABLET ORAL at 03:24

## 2020-06-19 RX ADMIN — METRONIDAZOLE 500 MG: 500 INJECTION, SOLUTION INTRAVENOUS at 02:57

## 2020-06-19 RX ADMIN — SODIUM CHLORIDE, SODIUM LACTATE, POTASSIUM CHLORIDE, AND CALCIUM CHLORIDE 100 ML/HR: 600; 310; 30; 20 INJECTION, SOLUTION INTRAVENOUS at 11:51

## 2020-06-19 RX ADMIN — CEFTRIAXONE 2 G: 2 INJECTION, POWDER, FOR SOLUTION INTRAMUSCULAR; INTRAVENOUS at 17:29

## 2020-06-19 RX ADMIN — Medication 10 ML: at 21:32

## 2020-06-19 RX ADMIN — SODIUM CHLORIDE, SODIUM LACTATE, POTASSIUM CHLORIDE, AND CALCIUM CHLORIDE 100 ML/HR: 600; 310; 30; 20 INJECTION, SOLUTION INTRAVENOUS at 21:41

## 2020-06-19 NOTE — PROGRESS NOTES
CM Note:  Transition of Care Plan:  RUR-12%  1. Monitor patient response to treatment and recommendations. 2. Await fruther evaluation, cardiology following. 3.  PT/OT evaluations: recommending snf, pt declines  4. Unsure of DC needs at this time. 5. Patient transport home per family at discharge. 6. CM to monitor for discharge needs.   NEHA Oliveros

## 2020-06-19 NOTE — PROGRESS NOTES
Bedside and Verbal shift change report given to Gianfranco Diez RN (oncoming nurse) by Myriam Varela RN (offgoing nurse). Report included the following information SBAR, Kardex and ED Summary.

## 2020-06-19 NOTE — PROGRESS NOTES
Problem: Falls - Risk of  Goal: *Absence of Falls  Outcome: Progressing Towards Goal  Note: Fall Risk Interventions:  Mobility Interventions: Bed/chair exit alarm, Communicate number of staff needed for ambulation/transfer, Patient to call before getting OOB         Medication Interventions: Bed/chair exit alarm, Patient to call before getting OOB, Teach patient to arise slowly    Elimination Interventions: Bed/chair exit alarm, Call light in reach, Patient to call for help with toileting needs    History of Falls Interventions: Bed/chair exit alarm

## 2020-06-19 NOTE — PROGRESS NOTES
Daily Progress Note: 6/19/2020  Keith Maldonado MD    Assessment/Plan:    SIRS (systemic inflammatory response syndrome)/Sepsis due to UTI (E Coli) with 4/4 SIRS  - treat as below     Hypothermia: likely environmental as patient was found in her yard, leukocytosis, tachycardia, tachypnea. Likely from UTI. Not severe sepsis. Lactate WNR. Also noting diarrhea. C. Diff not obtained due to stools becoming more firm. IV CTX and IV Flagyl initially - then to CTX. UTI  - gram neg compa in urine (E Coli) and blood  - initial coverage with Rocephin and Flagyl - to Rocephin 6/18       Fall: found down in her yard by neighbor, soiled in her own urine and feces due to inability to get up. -CM consult  -pt declining to consider rehab    Atrial flutter with RVR:   - Continue metoprolol and Eliquis. Cardiology consulted and adjusting BB. TTE ok      Cirrhosis:  Denies ETOH use. GI consulted      Renal insufficiency / Rhabdomyolysis: continue IVF. Follow BMP. CK improved      Rib fracture: pain control, incentive spirometer      Lung nodules: will need outpatient follow up      Hypothyroidism: TSH low.  Synthroid held for now.       HTN (hypertension):  Cont metoprolol otherwise hold other antihypertensives or meds as per Cardiology orders    Chronic Low back pain  - tx as needed      Code Status: FULL     Problem List:  Problem List as of 6/19/2020 Date Reviewed: 6/16/2020          Codes Class Noted - Resolved    Cirrhosis ([facility] ICD-10-CM: K74.60  ICD-9-CM: 571.5  6/16/2020 - Present        Transaminitis ICD-10-CM: R74.0  ICD-9-CM: 790.4  6/16/2020 - Present        Lung nodules ICD-10-CM: R91.8  ICD-9-CM: 793.19  6/16/2020 - Present        Atrial flutter ([facility] ICD-10-CM: R60.90  ICD-9-CM: 427.32  6/16/2020 - Present        HTN (hypertension) ICD-10-CM: I10  ICD-9-CM: 401.9  6/16/2020 - Present        Renal insufficiency ICD-10-CM: N28.9  ICD-9-CM: 593.9  6/16/2020 - Present        Rhabdomyolysis ICD-10-CM: C19.45  ICD-9-CM: 728.88  6/16/2020 - Present        SIRS (systemic inflammatory response syndrome) ([facility] ICD-10-CM: R65.10  ICD-9-CM: 995.90  6/16/2020 - Present        Hypothermia ICD-10-CM: T68. Cathye Spar  ICD-9-CM: 991.6  6/16/2020 - Present        Rib fracture ICD-10-CM: S22.39XA  ICD-9-CM: 807.00  6/16/2020 - Present        Sepsis ([facility] ICD-10-CM: A41.9  ICD-9-CM: 038.9, 995.91  6/16/2020 - Present        Hypothyroidism ICD-10-CM: E03.9  ICD-9-CM: 244.9  6/16/2020 - Present            Subjective:    75 y.o. female w/ hx of AF, HTN who presents with fall. Patient was found down in her yard by her neighbor, after lying there for 2 days. She could not provide details of the fall, but does report that she was too weak to get up to get help. She does report recent diarrhea, urinary incontinence however otherwise denies fevers of chills, abdominal pain, n/v, CP, SOB, cough, HA. She thought she had dizziness. ED workup showed SIRS criteria, UTI, elevated liver enzymes, and elevated CK. Ms. Ella Lepe is admitted for further evaluation and management. (Dr Tamanna Hernandez)    6/17:  Still having mult loose stools and episodic crampy ab pain. C diff pending when able to collect - nurses report majority of her stools are semi-soft to loose. Her major complaint is low back pain which is chronic. BPs better today so can restart her usual pain meds. Looks like she is in and out of A flutter - Cards to see. CK 1300. WBC better at 16K.      6/18:  Fewer stools and stools are soft. Urine and blood with gram neg rods - continuing Rocephin and Flagyl for now. WBC better at 12K. Afeb. CK down to 421. Cards has seen. 6/19:  C/O \"pain all over\" and cannot be more specific. She wants more narcotics - advised to try to hold at current levels of pain meds but since Creat is ok will give a couple doses of Toradol prn. E Coli in blood and urine - sens to Rocephin - will do another day of IV and then to po med tomorrow.   Cards titrating Metoprolol to control rate and recommends DC of Synthroid for now and recheck TSH in 6 wks to determine need to restart it. Since she is moving so slowly, recommended rehab but she is opposed.       Review of Systems:   A comprehensive review of systems was negative except for that written in the HPI. Objective:   Physical Exam:   Visit Vitals  BP (!) 164/92 (BP 1 Location: Right arm, BP Patient Position: At rest) Comment: nurse aware   Pulse (!) 120 Comment: nurse aware   Temp 98.1 °F (36.7 °C)   Resp 16   Ht 5' 8\" (1.727 m)   Wt 139.5 kg (307 lb 8.7 oz)   SpO2 95%   BMI 46.76 kg/m²      O2 Device: Room air  Temp (24hrs), Av.1 °F (36.7 °C), Min:97.7 °F (36.5 °C), Max:98.5 °F (36.9 °C)    1901 -  0700  In: 1533.3 [P.O.:300; I.V.:1233.3]  Out: -     0701 -  1900  In: 2916.7 [P.O.:1120; I.V.:1796.7]  Out: 1100 [Urine:1100]  General:  Alert, cooperative, elderly, obese, no distress, appears stated age. Head:  Normocephalic, without obvious abnormality, atraumatic. Eyes:  Conjunctivae/corneas clear. EOMs intact. Throat: Lips, mucosa, and tongue moist..   Neck: Supple, symmetrical, trachea midline, no adenopathy, thyroid: no enlargement/tenderness/nodules, no carotid bruit and no JVD. Lungs:   Clear to auscultation bilaterally. Chest wall:  No tenderness or deformity. Heart:  Irregular, rapid at times. no murmur, click, rub or gallop. Abdomen:   Soft, with mild generalized tenderness. No R/G. Bowel sounds active. No masses,  No organomegaly. Extremities: no cyanosis or edema. No calf tenderness or cords. Pulses: 2+ and symmetric all extremities. Skin: Skin color, texture, turgor normal. No rashes. Minor abrasions on knees are healing well. Neurologic: CNII-XII intact. Alert and oriented X 3.  equal.  No cogwheeling or rigidity. Gait not tested at this time. Sensation grossly normal to touch.   Gross motor of extremities normal.       Data Review: LIMITED ULTRASOUND OF THE ABDOMEN   6/17/20  INDICATION: Cirrhosis with common bile duct dilation  COMPARISON: CT chest abdomen pelvis 6/16/2020. TECHNIQUE:  Limited sonography of the abdomen was performed. FINDINGS:  LIVER: Mild intrahepatic biliary ductal dilation. No overt cirrhosis. The liver  is incompletely imaged. No mass is shown in the imaged portions. MAIN PORTAL VEIN: Patent. Appropriate hepatopetal flow. GALLBLADDER: Surgically absent. COMMON DUCT: 10 mm in diameter. Dilated. PANCREAS: A small portion of the visualized proximal pancreas is normal; the  remainder is obscured. SPLEEN: Not shown. RIGHT KIDNEY: 10.8 cm in length. No hydronephrosis, shadowing calculus, or  contour-deforming renal mass. LEFT KIDNEY: Not shown. AORTA: Normal caliber in its visualized portions. INFERIOR VENA CAVA: Normal caliber in its visualized portions. IMPRESSION:   Biliary ductal dilation. CT Chest/Ab/Pelvis 6/16/20  INDICATION: Tachypnea, cough, ground-level fall 2 days ago, hypothermia. Back  and left hip pain  COMPARISON: None  CONTRAST: None.   FINDINGS:  CHEST WALL: No mass or axillary lymphadenopathy. THYROID: No nodule. MEDIASTINUM: No mass or lymphadenopathy. GELA: No mass or lymphadenopathy. THORACIC AORTA: No aneurysm. MAIN PULMONARY ARTERY: Normal in caliber. TRACHEA/BRONCHI: Patent. ESOPHAGUS: No wall thickening or dilatation. HEART: Normal in size. PLEURA: No effusion or pneumothorax. LUNGS: 5 mm right upper lobe nodule (series 2, image 15). 2 mm left upper lobe  nodule (image 13). 2 mm right upper lobe nodule (series 2, image 29). The absence of intravenous contrast material reduces the sensitivity for  evaluation of the solid parenchymal organs of the abdomen. No focal  abnormalities are seen within the liver, spleen, pancreas or adrenal glands or  kidneys. No renal or ureteral calculus or obstruction is identified. Cholecystectomy clips are present.  There is a left renal 5.5 cm cyst with  Hounsfield unit measurement of 6.4 (axial image 71). The liver is macrolobulated  with mild central biliary prominence. The aorta is atherosclerotic but tapers without aneurysm. There is no  retroperitoneal adenopathy or mass. The bowel is not obstructed. There are multiple sigmoid diverticula. The  appendix is normal on axial image 101. There is no ascites or free  intraperitoneal air. The uterus is small. There is no pelvic mass or adenopathy. The right total hip replacement in place creates a moderate amount of streak  artifact. There is disc space narrowing at multiple lumbar levels. There is an acute left lateral 11th rib fracture (sagittal image 115)  IMPRESSION:   Pulmonary micronodules  Incidental sigmoid diverticulosis. Cirrhosis with mild intrahepatic biliary dilatation. EXAM:  CT CERVICAL SPINE WITHOUT CONTRAST 6/16/20  INDICATION: Limited ROM, pain, GLF.  COMPARISON: None. CONTRAST:  None.   FINDINGS:  There is no acute fracture or dislocation. C5-6 and C6-7 show some prominent  change of disc degeneration and spondylosis of.  Prominent facet hypertrophy seen on the right at C3-C4. On the left at C4-C5. Bilateral foraminal narrowing seen at C5-6 and C6-7. Mild canal stenosis seen at  those 2 level by spur. IMPRESSION: Prominent spondylosis, no acute findings seen. ECHO 6/18/20  Interpretation Summary   Result status: Final result   · Image quality for this study was suboptimal.  · Normal cavity size and systolic function (ejection fraction normal). Mildly increased wall thickness. Calculated left ventricular ejection fraction is 55%. · Not well visualized. · Probably trileaflet aortic valve. Aortic valve sclerosis. · Appears normal. Mild mitral annular calcification. Did not appreciate any MR in the images provided. · Mild tricuspid valve regurgitation is present.          Recent Days:  Recent Labs     06/19/20  0327 06/18/20  0314 06/17/20  0120   WBC 12.7* 12.0* 16.0*   HGB 10.6* 10.5* 11.0*   HCT 34.4* 33.7* 35.7    360 309     Recent Labs     06/19/20  0327 06/18/20  0314 06/17/20  0120 06/16/20  1406    143 141 140   K 4.0 3.6 3.5 3.7   * 113* 112* 106   CO2 28 25 21 22   * 155* 164* 114*   BUN 15 19 36* 47*   CREA 0.94 0.82 0.95 1.24*   CA 8.8 8.4* 8.6 8.9   MG  --  2.2 2.2 2.8*   PHOS  --   --  3.3  --    ALB 2.8* 2.6* 2.7* 3.2*   TBILI 0.4 0.4 0.4 0.8   ALT 71 74 88* 107*     No results for input(s): PH, PCO2, PO2, HCO3, FIO2 in the last 72 hours.     24 Hour Results:  Recent Results (from the past 24 hour(s))   ECHO ADULT COMPLETE    Collection Time: 06/18/20 12:15 PM   Result Value Ref Range    Ao Root D 3.51 cm    AO ASC D 3.37 cm    LVIDd 4.42 3.9 - 5.3 cm    LVPWd 1.20 (A) 0.6 - 0.9 cm    LVIDs 3.78 cm    IVSd 1.17 (A) 0.6 - 0.9 cm    LVOT d 2.23 cm    Left Atrium to Aortic Root Ratio 1.07     Inferior Vena Cava Diameter Sniffing 2.21 cm    LV Mass .0 (A) 67 - 162 g    LV Mass AL Index 90.5 43 - 95 g/m2    Left Atrium Major Axis 3.76 cm    Triscuspid Valve Regurgitation Peak Gradient 26.5 mmHg    Pulmonic Valve Max Velocity 59.12 cm/s    TR Max Velocity 257.57 cm/s    Left Ventricular Fractional Shortening by 2D 18.435701708 %    Left Ventricular End Diastolic Volume by Teichholz Method 2.33800998634 mL    Left Ventricular End Systolic Volume by Teichholz Method 1.03057463903 mL    Left Ventricular Stroke Volume by Teichholz Method 86.22811693 mL    PV peak gradient 1.4 mmHg   METABOLIC PANEL, COMPREHENSIVE    Collection Time: 06/19/20  3:27 AM   Result Value Ref Range    Sodium 143 136 - 145 mmol/L    Potassium 4.0 3.5 - 5.1 mmol/L    Chloride 111 (H) 97 - 108 mmol/L    CO2 28 21 - 32 mmol/L    Anion gap 4 (L) 5 - 15 mmol/L    Glucose 141 (H) 65 - 100 mg/dL    BUN 15 6 - 20 MG/DL    Creatinine 0.94 0.55 - 1.02 MG/DL    BUN/Creatinine ratio 16 12 - 20      GFR est AA >60 >60 ml/min/1.73m2    GFR est non-AA 58 (L) >60 ml/min/1.73m2    Calcium 8.8 8.5 - 10.1 MG/DL    Bilirubin, total 0.4 0.2 - 1.0 MG/DL    ALT (SGPT) 71 12 - 78 U/L    AST (SGOT) 37 15 - 37 U/L    Alk. phosphatase 91 45 - 117 U/L    Protein, total 6.3 (L) 6.4 - 8.2 g/dL    Albumin 2.8 (L) 3.5 - 5.0 g/dL    Globulin 3.5 2.0 - 4.0 g/dL    A-G Ratio 0.8 (L) 1.1 - 2.2     CBC WITH AUTOMATED DIFF    Collection Time: 06/19/20  3:27 AM   Result Value Ref Range    WBC 12.7 (H) 3.6 - 11.0 K/uL    RBC 4.06 3.80 - 5.20 M/uL    HGB 10.6 (L) 11.5 - 16.0 g/dL    HCT 34.4 (L) 35.0 - 47.0 %    MCV 84.7 80.0 - 99.0 FL    MCH 26.1 26.0 - 34.0 PG    MCHC 30.8 30.0 - 36.5 g/dL    RDW 16.6 (H) 11.5 - 14.5 %    PLATELET 910 044 - 241 K/uL    MPV 9.8 8.9 - 12.9 FL    NRBC 0.3 (H) 0  WBC    ABSOLUTE NRBC 0.04 (H) 0.00 - 0.01 K/uL    NEUTROPHILS 76 (H) 32 - 75 %    BAND NEUTROPHILS 1 0 - 6 %    LYMPHOCYTES 18 12 - 49 %    MONOCYTES 4 (L) 5 - 13 %    EOSINOPHILS 0 0 - 7 %    BASOPHILS 0 0 - 1 %    METAMYELOCYTES 1 (H) 0 %    IMMATURE GRANULOCYTES 0 %    ABS. NEUTROPHILS 9.8 (H) 1.8 - 8.0 K/UL    ABS. LYMPHOCYTES 2.3 0.8 - 3.5 K/UL    ABS. MONOCYTES 0.5 0.0 - 1.0 K/UL    ABS. EOSINOPHILS 0.0 0.0 - 0.4 K/UL    ABS. BASOPHILS 0.0 0.0 - 0.1 K/UL    ABS. IMM.  GRANS. 0.0 K/UL    DF MANUAL      RBC COMMENTS ANISOCYTOSIS  1+        RBC COMMENTS OVALOCYTES  PRESENT        RBC COMMENTS POLYCHROMASIA  PRESENT        WBC COMMENTS TOXIC GRANULATION     CK    Collection Time: 06/19/20  3:27 AM   Result Value Ref Range     (H) 26 - 192 U/L       Medications reviewed  Current Facility-Administered Medications   Medication Dose Route Frequency    metoprolol tartrate (LOPRESSOR) tablet 37.5 mg  37.5 mg Oral Q6H    gabapentin (NEURONTIN) capsule 300 mg  300 mg Oral TID    HYDROcodone-acetaminophen (NORCO)  mg tablet 1 Tab  1 Tab Oral Q6H PRN    sodium chloride (NS) flush 5-40 mL  5-40 mL IntraVENous Q8H    sodium chloride (NS) flush 5-40 mL  5-40 mL IntraVENous PRN    acetaminophen (TYLENOL) tablet 650 mg  650 mg Oral Q6H PRN    naloxone (NARCAN) injection 0.4 mg  0.4 mg IntraVENous PRN    cefTRIAXone (ROCEPHIN) 2 g in 0.9% sodium chloride (MBP/ADV) 50 mL  2 g IntraVENous Q24H    apixaban (ELIQUIS) tablet 5 mg  5 mg Oral BID    pantoprazole (PROTONIX) tablet 40 mg  40 mg Oral ACB    pravastatin (PRAVACHOL) tablet 20 mg  20 mg Oral QHS    lactated Ringers infusion  100 mL/hr IntraVENous CONTINUOUS    metroNIDAZOLE (FLAGYL) IVPB premix 500 mg  500 mg IntraVENous Q8H       Care Plan discussed with: Patient and Nurse    Total time spent with patient: 30 minutes.     Verna Asher MD

## 2020-06-19 NOTE — PROGRESS NOTES
Nutrition Assessment:    RECOMMENDATIONS/INTERVENTION(S):   1. Continue cardiac diet. 2. Continue to monitor intakes, wt changes, labs, pt's desire for nutrition education. ASSESSMENT:   6/19: Pt assessed for LOS. Admitted with sepsis. PMH includes HTN. BMI 46.8, c/w morbid obesity. Estimated energy needs calculated to reflect healthy weight loss. will provide weight loss education if/when desired by pt. Pt reports good appetite and po intake currently and PTA. Breakfast tray in room 50% eaten per visual. Recorded intakes 50-80% meals. Denies any recent weight changes. No c/o n/v. Gave pt menu and explained cardiac diet. Pt has no nutrition-related questions at this time. Labs- -[phone]. Meds- Protonix. Diet Order: Cardiac  % Eaten:    Patient Vitals for the past 72 hrs:   % Diet Eaten   06/18/20 0831 75 %   06/17/20 1433 50 %   06/17/20 0942 80 %       Pertinent Medications: [x] Reviewed    Labs: [x] Reviewed    Anthropometrics: Height: 5' 8\" (172.7 cm) Weight: 139.5 kg (307 lb 8.7 oz)    IBW (%IBW):   ( ) UBW (%UBW):   (  %)      BMI: Body mass index is 46.76 kg/m². This BMI is indicative of:   [] Underweight    [] Normal    [] Overweight    []  Obesity    [x]  Extreme Obesity (BMI>40)  Estimated Nutrition Needs (Based on): 2022 Kcals/day(1940 x 1.3 Af (-500)) , 64 g(1-1.2 g/kg IBW) Protein  Carbohydrate: At Least 130 g/day  Fluids: 2022 mL/day (1 ml/kcal)    Last BM: 6/18   [x]Active     []Hyperactive  []Hypoactive       [] Absent   BS  Skin:    [x] Intact   [] Incision  [] Breakdown   [] DTI   [] Tears/Excoriation/Abrasion  []Edema [] Other:      Wt Readings from Last 30 Encounters:   06/18/20 139.5 kg (307 lb 8.7 oz)   07/15/19 95.3 kg (210 lb)      NUTRITION DIAGNOSES:   Problem:  Overweight/obesity     Etiology: related to hx of excess energy intake     Signs/Symptoms: as evidenced by BMI 45.8      NUTRITION INTERVENTIONS:  Meals/Snacks: General/healthful diet                  GOAL:   PO intake >75% meals with 1#/week weight loss next 5-7 days    Cultural, Druze, or Ethnic Dietary Needs: None     EDUCATION & DISCHARGE NEEDS:    [x] None Identified   [] Identified and Education Provided/Documented   [] Identified and Pt declined/was not appropriate      [] Interdisciplinary Care Plan Reviewed/Documented    [x] Discharge Needs: heart healthy diet   [] No Nutrition Related Discharge Needs    NUTRITION RISK:   Pt Is At Nutrition Risk  [x]     No Nutrition Risk Identified  []       PT SEEN FOR:    []  MD Consult: []Calorie Count      []Diabetic Diet Education        []Diet Education     []Electrolyte Management     []General Nutrition Management and Supplements     []Management of Tube Feeding     []TPN Recommendations    []  RN Referral:  []MST score >=2     []Enteral/Parenteral Nutrition PTA     []Pregnant: Gestational DM or Multigestation                 [] Pressure Ulcer    []  Low BMI      [x]  Length of Stay       [] Dysphagia Diet         [] Ventilator  []  Follow-up     Previous Recommendations:   [] Implemented          [] Not Implemented          [x] Not Applicable    Previous Goal:   [] Met              [] Progressing Towards Goal              [] Not Progressing Towards Goal   [x] Not Applicable            Moshe Stockton, [address] [facility]  Pager [phone]  Phone [phone]

## 2020-06-19 NOTE — PROGRESS NOTES
Occupational Therapy Note; Atempted OT however pt verbalizing 10/10 pain in her ribs when she tried to move. RN notified and will attempt OT next tx date.

## 2020-06-19 NOTE — ROUTINE PROCESS
Bedside and Verbal shift change report given to NEHA Guevara (oncoming nurse) by Margo Bird (offgoing nurse). Report included the following information SBAR and Kardex.

## 2020-06-19 NOTE — PROGRESS NOTES
Cardiology Progress Note                              [address], [place], [address] [facility]                                 Phone [phone]; Fax [phone]        2020 9:20 AM     Admit Date:           2020  Admit Diagnosis:  Sepsis ([address] [A41.9]  :          1945   MRN:          [id number]       Impression Plan/Recommendation   1. Atrial flutter RVR, atypical  2. PAF s/p maze  3. S/p RADHA ligation   4. MVR  5. Sepsis, UTI  6. Hypertension   7. CAD s/p stents to Lcx & LAD   8. Diarrhea   9. Rhabdomyolysis   10. Fall /questionable syncope   11. NSVT  12. Hypothyroid, medication induced hypothyroid                          · Records reviewed   · Echo to assess for endocarditis w hx of MVR in the setting of sepsis   · V rate 120's - increase BB to 50 mg q6h. · Continue eliquis   · Hold norvasc and losartan d/t soft BP and will adjusting rate control meds  · Recommend 30 day monitor of d/c to assess for ventricular arrhythmia/pauses. She would like the monitor to be ordered via Dr Jose Alexander office, will try to arrange   · Echo showing pEF, no WMA, no MR -reviewed results with the patient. · Holding synthroid - TSH low- also contributing to elevated V rate. Recommend restarting levothyroxine after repeat TSH approximately 6 weeks after hospital discharge.                Records from Dr Jose Alexander reviewed  Office note 2020  Hx is CAD s/p stents to Lcx & LAD   Hx of mvr -maze and RADHA ligation 10/2019  PAF w hx of [facility] 2018        No intake/output data recorded.     Last 3 Recorded Weights in this Encounter    20 0438 20 0611 20 1213   Weight: 232 lb 5.8 oz (105.4 kg) 307 lb 8 oz (139.5 kg) 307 lb 8.7 oz (139.5 kg)          190 -  0700  In: 3883.3 [P.O.:1060; I.V.:2823.3]  Out: 700 [Urine:700]    SUBJECTIVE               Josee Loza denies palpitations, irregular heart beat, SOB, chest pain or LE edema. Current Facility-Administered Medications   Medication Dose Route Frequency    metoprolol tartrate (LOPRESSOR) tablet 50 mg  50 mg Oral Q6H    gabapentin (NEURONTIN) capsule 300 mg  300 mg Oral TID    HYDROcodone-acetaminophen (NORCO)  mg tablet 1 Tab  1 Tab Oral Q6H PRN    sodium chloride (NS) flush 5-40 mL  5-40 mL IntraVENous Q8H    sodium chloride (NS) flush 5-40 mL  5-40 mL IntraVENous PRN    acetaminophen (TYLENOL) tablet 650 mg  650 mg Oral Q6H PRN    naloxone (NARCAN) injection 0.4 mg  0.4 mg IntraVENous PRN    cefTRIAXone (ROCEPHIN) 2 g in 0.9% sodium chloride (MBP/ADV) 50 mL  2 g IntraVENous Q24H    apixaban (ELIQUIS) tablet 5 mg  5 mg Oral BID    pantoprazole (PROTONIX) tablet 40 mg  40 mg Oral ACB    pravastatin (PRAVACHOL) tablet 20 mg  20 mg Oral QHS    lactated Ringers infusion  100 mL/hr IntraVENous CONTINUOUS      OBJECTIVE               Intake/Output Summary (Last 24 hours) at 6/19/2020 0821  Last data filed at 6/19/2020 0455  Gross per 24 hour   Intake 2153.33 ml   Output    Net 2153.33 ml       Review of Systems - History obtained from the patient AS PER  HPI    Telemetry atrial flutter v rate 90-120s  7 bts of NSVT    PHYSICAL EXAM        Visit Vitals  /83 (BP 1 Location: Right arm, BP Patient Position: At rest)   Pulse (!) 113 Comment: told nurse   Temp 97.4 °F (36.3 °C)   Resp 18   Ht 5' 8\" (1.727 m)   Wt 307 lb 8.7 oz (139.5 kg)   SpO2 96%   BMI 46.76 kg/m²       Gen: Well-developed, obese, in no acute distress  alert and oriented x 3  HEENT:  Pink conjunctivae, Hearing grossly normal.No scleral icterus or conjunctival, moist mucous membranes  Neck: Supple, No JVD  Resp: No accessory muscle use, Clear breath sounds, No rales or rhonchi  Card: Irregular/accelerated, Rate & Rythm, 2/6  Murmur, no rubs or gallop. No thrills.    GI:          soft, non-tender   MSK: No cyanosis or clubbing, good capillary refill  Skin: No rashes or ulcers, ecchymosis BUE/BLE  Neuro:  Cranial nerves are grossly intact, moving all four extremities, no focal deficit, follows commands appropriately  Psych:  Good insight, oriented to person, place and time, alert, Nml Affect  LE: No edema       DATA REVIEW            Laboratory and Imaging have been reviewed by me and are notable for  Recent Labs     06/19/20 0327 06/18/20  0314 06/17/20  0120 06/16/20  1406   * 421* 1,390* 2,758*   TROIQ  --   --   --  <0.05     Recent Labs     06/19/20 0327 06/18/20  0314 06/17/20  0120 06/16/20  1406    143 141 140   K 4.0 3.6 3.5 3.7   CO2 28 25 21 22   BUN 15 19 36* 47*   CREA 0.94 0.82 0.95 1.24*   * 155* 164* 114*   PHOS  --   --  3.3  --    MG  --  2.2 2.2 2.8*   WBC 12.7* 12.0* 16.0* 20.8*   HGB 10.6* 10.5* 11.0* 12.2   HCT 34.4* 33.7* 35.7 37.7    360 309 414*             Ganesh Ambrose NP

## 2020-06-19 NOTE — PROGRESS NOTES
Bell Cortés RN spoke w Dr Sugar Juarez office  30 day monitor ordered via [facility] and to be mailed to residency

## 2020-06-20 LAB
ALBUMIN SERPL-MCNC: 2.5 G/DL (ref 3.5–5)
ALBUMIN/GLOB SERPL: 0.7 {RATIO} (ref 1.1–2.2)
ALP SERPL-CCNC: 79 U/L (ref 45–117)
ALT SERPL-CCNC: 61 U/L (ref 12–78)
ANION GAP SERPL CALC-SCNC: 5 MMOL/L (ref 5–15)
AST SERPL-CCNC: 28 U/L (ref 15–37)
BASOPHILS # BLD: 0 K/UL (ref 0–0.1)
BASOPHILS NFR BLD: 0 % (ref 0–1)
BILIRUB SERPL-MCNC: 0.3 MG/DL (ref 0.2–1)
BUN SERPL-MCNC: 14 MG/DL (ref 6–20)
BUN/CREAT SERPL: 15 (ref 12–20)
CALCIUM SERPL-MCNC: 8.6 MG/DL (ref 8.5–10.1)
CHLORIDE SERPL-SCNC: 109 MMOL/L (ref 97–108)
CK SERPL-CCNC: 154 U/L (ref 26–192)
CO2 SERPL-SCNC: 29 MMOL/L (ref 21–32)
CREAT SERPL-MCNC: 0.92 MG/DL (ref 0.55–1.02)
DIFFERENTIAL METHOD BLD: ABNORMAL
EOSINOPHIL # BLD: 0.1 K/UL (ref 0–0.4)
EOSINOPHIL NFR BLD: 1 % (ref 0–7)
ERYTHROCYTE [DISTWIDTH] IN BLOOD BY AUTOMATED COUNT: 16.6 % (ref 11.5–14.5)
GLOBULIN SER CALC-MCNC: 3.4 G/DL (ref 2–4)
GLUCOSE SERPL-MCNC: 183 MG/DL (ref 65–100)
HCT VFR BLD AUTO: 32.3 % (ref 35–47)
HGB BLD-MCNC: 9.9 G/DL (ref 11.5–16)
IMM GRANULOCYTES # BLD AUTO: 0 K/UL (ref 0–0.04)
IMM GRANULOCYTES NFR BLD AUTO: 0 % (ref 0–0.5)
LYMPHOCYTES # BLD: 2.3 K/UL (ref 0.8–3.5)
LYMPHOCYTES NFR BLD: 21 % (ref 12–49)
MCH RBC QN AUTO: 26.1 PG (ref 26–34)
MCHC RBC AUTO-ENTMCNC: 30.7 G/DL (ref 30–36.5)
MCV RBC AUTO: 85.2 FL (ref 80–99)
METAMYELOCYTES NFR BLD MANUAL: 3 %
MONOCYTES # BLD: 0.5 K/UL (ref 0–1)
MONOCYTES NFR BLD: 5 % (ref 5–13)
MYELOCYTES NFR BLD MANUAL: 1 %
NEUTS BAND NFR BLD MANUAL: 1 %
NEUTS SEG # BLD: 7.5 K/UL (ref 1.8–8)
NEUTS SEG NFR BLD: 68 % (ref 32–75)
NRBC # BLD: 0.04 K/UL (ref 0–0.01)
NRBC BLD-RTO: 0.4 PER 100 WBC
PLATELET # BLD AUTO: 391 K/UL (ref 150–400)
PMV BLD AUTO: 10 FL (ref 8.9–12.9)
POTASSIUM SERPL-SCNC: 3.7 MMOL/L (ref 3.5–5.1)
PROT SERPL-MCNC: 5.9 G/DL (ref 6.4–8.2)
RBC # BLD AUTO: 3.79 M/UL (ref 3.8–5.2)
RBC MORPH BLD: ABNORMAL
SODIUM SERPL-SCNC: 143 MMOL/L (ref 136–145)
WBC # BLD AUTO: 10.9 K/UL (ref 3.6–11)
WBC MORPH BLD: ABNORMAL

## 2020-06-20 PROCEDURE — 82550 ASSAY OF CK (CPK): CPT

## 2020-06-20 PROCEDURE — 36415 COLL VENOUS BLD VENIPUNCTURE: CPT

## 2020-06-20 PROCEDURE — 74011250637 HC RX REV CODE- 250/637: Performed by: INTERNAL MEDICINE

## 2020-06-20 PROCEDURE — 74011250637 HC RX REV CODE- 250/637: Performed by: STUDENT IN AN ORGANIZED HEALTH CARE EDUCATION/TRAINING PROGRAM

## 2020-06-20 PROCEDURE — 74011250637 HC RX REV CODE- 250/637: Performed by: FAMILY MEDICINE

## 2020-06-20 PROCEDURE — 74011250636 HC RX REV CODE- 250/636: Performed by: INTERNAL MEDICINE

## 2020-06-20 PROCEDURE — 65660000000 HC RM CCU STEPDOWN

## 2020-06-20 PROCEDURE — 85025 COMPLETE CBC W/AUTO DIFF WBC: CPT

## 2020-06-20 PROCEDURE — 74011250636 HC RX REV CODE- 250/636: Performed by: FAMILY MEDICINE

## 2020-06-20 PROCEDURE — 74011000258 HC RX REV CODE- 258: Performed by: INTERNAL MEDICINE

## 2020-06-20 PROCEDURE — 80053 COMPREHEN METABOLIC PANEL: CPT

## 2020-06-20 RX ADMIN — HYDROCODONE BITARTRATE AND ACETAMINOPHEN 1 TABLET: 10; 325 TABLET ORAL at 10:55

## 2020-06-20 RX ADMIN — PANTOPRAZOLE SODIUM 40 MG: 40 TABLET, DELAYED RELEASE ORAL at 05:56

## 2020-06-20 RX ADMIN — Medication 10 ML: at 21:14

## 2020-06-20 RX ADMIN — GABAPENTIN 300 MG: 300 CAPSULE ORAL at 09:04

## 2020-06-20 RX ADMIN — SODIUM CHLORIDE, SODIUM LACTATE, POTASSIUM CHLORIDE, AND CALCIUM CHLORIDE 100 ML/HR: 600; 310; 30; 20 INJECTION, SOLUTION INTRAVENOUS at 19:58

## 2020-06-20 RX ADMIN — SODIUM CHLORIDE, SODIUM LACTATE, POTASSIUM CHLORIDE, AND CALCIUM CHLORIDE 100 ML/HR: 600; 310; 30; 20 INJECTION, SOLUTION INTRAVENOUS at 05:56

## 2020-06-20 RX ADMIN — ACETAMINOPHEN 650 MG: 325 TABLET ORAL at 21:12

## 2020-06-20 RX ADMIN — APIXABAN 5 MG: 5 TABLET, FILM COATED ORAL at 09:04

## 2020-06-20 RX ADMIN — HYDROCODONE BITARTRATE AND ACETAMINOPHEN 1 TABLET: 10; 325 TABLET ORAL at 03:51

## 2020-06-20 RX ADMIN — Medication 10 ML: at 16:24

## 2020-06-20 RX ADMIN — METOPROLOL TARTRATE 50 MG: 50 TABLET, FILM COATED ORAL at 10:59

## 2020-06-20 RX ADMIN — Medication 10 ML: at 05:56

## 2020-06-20 RX ADMIN — METOPROLOL TARTRATE 50 MG: 50 TABLET, FILM COATED ORAL at 23:21

## 2020-06-20 RX ADMIN — METHYLPREDNISOLONE SODIUM SUCCINATE 125 MG: 125 INJECTION, POWDER, FOR SOLUTION INTRAMUSCULAR; INTRAVENOUS at 21:13

## 2020-06-20 RX ADMIN — METHYLPREDNISOLONE SODIUM SUCCINATE 125 MG: 125 INJECTION, POWDER, FOR SOLUTION INTRAMUSCULAR; INTRAVENOUS at 16:15

## 2020-06-20 RX ADMIN — METOPROLOL TARTRATE 50 MG: 50 TABLET, FILM COATED ORAL at 00:06

## 2020-06-20 RX ADMIN — GABAPENTIN 300 MG: 300 CAPSULE ORAL at 21:13

## 2020-06-20 RX ADMIN — METOPROLOL TARTRATE 50 MG: 50 TABLET, FILM COATED ORAL at 17:18

## 2020-06-20 RX ADMIN — METHYLPREDNISOLONE SODIUM SUCCINATE 125 MG: 125 INJECTION, POWDER, FOR SOLUTION INTRAMUSCULAR; INTRAVENOUS at 09:04

## 2020-06-20 RX ADMIN — HYDROCODONE BITARTRATE AND ACETAMINOPHEN 1 TABLET: 10; 325 TABLET ORAL at 17:18

## 2020-06-20 RX ADMIN — CEFTRIAXONE 2 G: 2 INJECTION, POWDER, FOR SOLUTION INTRAMUSCULAR; INTRAVENOUS at 16:13

## 2020-06-20 RX ADMIN — METOPROLOL TARTRATE 50 MG: 50 TABLET, FILM COATED ORAL at 05:56

## 2020-06-20 RX ADMIN — APIXABAN 5 MG: 5 TABLET, FILM COATED ORAL at 17:18

## 2020-06-20 RX ADMIN — PRAVASTATIN SODIUM 20 MG: 20 TABLET ORAL at 21:12

## 2020-06-20 RX ADMIN — GABAPENTIN 300 MG: 300 CAPSULE ORAL at 16:13

## 2020-06-20 NOTE — PROGRESS NOTES
Bedside and Verbal shift change report given to Jose Espinal RN (oncoming nurse) by Myriam Varela RN (offgoing nurse). Report included the following information SBAR, Kardex and ED Summary.

## 2020-06-20 NOTE — PROGRESS NOTES
Problem: Falls - Risk of  Goal: *Absence of Falls  Outcome: Progressing Towards Goal  Note: Fall Risk Interventions:  Mobility Interventions: Bed/chair exit alarm, Communicate number of staff needed for ambulation/transfer, Patient to call before getting OOB         Medication Interventions: Bed/chair exit alarm, Patient to call before getting OOB, Teach patient to arise slowly    Elimination Interventions: Bed/chair exit alarm, Call light in reach, Patient to call for help with toileting needs    History of Falls Interventions: Bed/chair exit alarm, Door open when patient unattended, Room close to nurse's station

## 2020-06-20 NOTE — PROGRESS NOTES
SUBJECTIVE      No acute events overnight. Rates predominantly controlled currently on oral metoprolol. Remains on eliquis. ACTIVE PROBLEM LIST     Patient Active Problem List    Diagnosis Date Noted    Cirrhosis ([facility] 06/16/2020    Transaminitis 06/16/2020    Lung nodules 06/16/2020    Atrial flutter ([facility] 06/16/2020    HTN (hypertension) 06/16/2020    Renal insufficiency 06/16/2020    Rhabdomyolysis 06/16/2020    SIRS (systemic inflammatory response syndrome) ([facility] 06/16/2020    Hypothermia 06/16/2020    Rib fracture 06/16/2020    Sepsis ([facility] 06/16/2020    Hypothyroidism 06/16/2020          MEDICATIONS     Current Facility-Administered Medications   Medication Dose Route Frequency    methylPREDNISolone (PF) (Solu-MEDROL) injection 125 mg  125 mg IntraVENous Q8H    metoprolol tartrate (LOPRESSOR) tablet 50 mg  50 mg Oral Q6H    gabapentin (NEURONTIN) capsule 300 mg  300 mg Oral TID    HYDROcodone-acetaminophen (NORCO)  mg tablet 1 Tab  1 Tab Oral Q6H PRN    sodium chloride (NS) flush 5-40 mL  5-40 mL IntraVENous Q8H    sodium chloride (NS) flush 5-40 mL  5-40 mL IntraVENous PRN    acetaminophen (TYLENOL) tablet 650 mg  650 mg Oral Q6H PRN    naloxone (NARCAN) injection 0.4 mg  0.4 mg IntraVENous PRN    cefTRIAXone (ROCEPHIN) 2 g in 0.9% sodium chloride (MBP/ADV) 50 mL  2 g IntraVENous Q24H    apixaban (ELIQUIS) tablet 5 mg  5 mg Oral BID    pantoprazole (PROTONIX) tablet 40 mg  40 mg Oral ACB    pravastatin (PRAVACHOL) tablet 20 mg  20 mg Oral QHS    lactated Ringers infusion  100 mL/hr IntraVENous CONTINUOUS          PAST MEDICAL HISTORY     No past medical history on file. PAST SURGICAL HISTORY     No past surgical history on file. ALLERGIES     Allergies   Allergen Reactions    Metformin Other (comments)     Metformin toxicity           FAMILY HISTORY     No family history on file.  negative for cardiac disease     SOCIAL HISTORY     Social History Socioeconomic History    Marital status:      Spouse name: Not on file    Number of children: Not on file    Years of education: Not on file    Highest education level: Not on file       MEDICATIONS     Current Facility-Administered Medications   Medication Dose Route Frequency    methylPREDNISolone (PF) (Solu-MEDROL) injection 125 mg  125 mg IntraVENous Q8H    metoprolol tartrate (LOPRESSOR) tablet 50 mg  50 mg Oral Q6H    gabapentin (NEURONTIN) capsule 300 mg  300 mg Oral TID    HYDROcodone-acetaminophen (NORCO)  mg tablet 1 Tab  1 Tab Oral Q6H PRN    sodium chloride (NS) flush 5-40 mL  5-40 mL IntraVENous Q8H    sodium chloride (NS) flush 5-40 mL  5-40 mL IntraVENous PRN    acetaminophen (TYLENOL) tablet 650 mg  650 mg Oral Q6H PRN    naloxone (NARCAN) injection 0.4 mg  0.4 mg IntraVENous PRN    cefTRIAXone (ROCEPHIN) 2 g in 0.9% sodium chloride (MBP/ADV) 50 mL  2 g IntraVENous Q24H    apixaban (ELIQUIS) tablet 5 mg  5 mg Oral BID    pantoprazole (PROTONIX) tablet 40 mg  40 mg Oral ACB    pravastatin (PRAVACHOL) tablet 20 mg  20 mg Oral QHS    lactated Ringers infusion  100 mL/hr IntraVENous CONTINUOUS       I have reviewed the nurses notes, vitals, problem list, allergy list, medical history, family, social history and medications. REVIEW OF SYMPTOMS      General: Pt denies excessive weight gain or loss. Pt is able to conduct ADL's  HEENT: Denies blurred vision, headaches, hearing loss, epistaxis and difficulty swallowing. Respiratory: Denies cough, congestion, shortness of breath, TYSON, wheezing or stridor.   Cardiovascular: Denies precordial pain, palpitations, edema or PND  Gastrointestinal: Denies poor appetite, indigestion, abdominal pain or blood in stool  Genitourinary: Denies hematuria, dysuria, increased urinary frequency  Musculoskeletal: Denies joint pain or swelling from muscles or joints  Neurologic: Denies tremor, paresthesias, headache, or sensory motor disturbance  Psychiatric: Denies confusion, insomnia, depression  Integumentray: Denies rash, itching or ulcers. Hematologic: Denies easy bruising, bleeding       PHYSICAL EXAMINATION      Vitals:    06/20/20 0346 06/20/20 0709 06/20/20 0724 06/20/20 1411   BP: 126/65  143/82    Pulse: (!) 109 92 (!) 102 (!) 114   Resp: 20  18    Temp: 98.1 °F (36.7 °C)  98.5 °F (36.9 °C)    SpO2: 92%  95%    Weight: 244 lb 7.8 oz (110.9 kg)      Height:         General: Well developed, in no acute distress. HEENT: No jaundice, oral mucosa moist, no oral ulcers  Neck: Supple, no stiffness, no lymphadenopathy, supple  Heart: irregularly irregular, no murmur, gallop or rub, no jugular venous distention  Respiratory: Clear bilaterally x 4, no wheezing or rales  Abdomen:   Soft, non-tender, bowel sounds are active.   Extremities:  No edema, normal cap refill, no cyanosis. Musculoskeletal: No clubbing, no deformities  Neuro: A&Ox3, speech clear, gait stable, cooperative, no focal neurologic deficits  Skin: Skin color is normal. No rashes or lesions. Non diaphoretic, moist.  Vascular: 2+ pulses symmetric in all extremities       DIAGNOSTIC DATA      TELEMETRY: AT/AFL       LABORATORY DATA      Lab Results   Component Value Date/Time    WBC 10.9 06/20/2020 01:11 AM    HGB 9.9 (L) 06/20/2020 01:11 AM    HCT 32.3 (L) 06/20/2020 01:11 AM    PLATELET 589 93/47/7470 01:11 AM    MCV 85.2 06/20/2020 01:11 AM      Lab Results   Component Value Date/Time    Sodium 143 06/20/2020 01:11 AM    Potassium 3.7 06/20/2020 01:11 AM    Chloride 109 (H) 06/20/2020 01:11 AM    CO2 29 06/20/2020 01:11 AM    Anion gap 5 06/20/2020 01:11 AM    Glucose 183 (H) 06/20/2020 01:11 AM    BUN 14 06/20/2020 01:11 AM    Creatinine 0.92 06/20/2020 01:11 AM    BUN/Creatinine ratio 15 06/20/2020 01:11 AM    GFR est AA >60 06/20/2020 01:11 AM    GFR est non-AA 60 (L) 06/20/2020 01:11 AM    Calcium 8.6 06/20/2020 01:11 AM    Bilirubin, total 0.3 06/20/2020 01:11 AM    Alk. phosphatase 79 06/20/2020 01:11 AM    Protein, total 5.9 (L) 06/20/2020 01:11 AM    Albumin 2.5 (L) 06/20/2020 01:11 AM    Globulin 3.4 06/20/2020 01:11 AM    A-G Ratio 0.7 (L) 06/20/2020 01:11 AM    ALT (SGPT) 61 06/20/2020 01:11 AM           ASSESSMENT      Atrial flutter RVR, atypical  PAF s/p maze  S/p RADHA ligation   MVR  Sepsis, UTI  Hypertension   CAD s/p stents to Lcx & LAD 2010  Diarrhea   Rhabdomyolysis   Fall /questionable syncope   NSVT         PLAN     Continue current drug regimen. Monitor as OP. FU with Shey on DC.          Kiran Pablo MD  Cardiac Electrophysiology / Cardiology    [address], [address] [facility], [address]  [place], [address] [place] [address]  [phone] / [phone] Fax   [phone] / [phone] Fax

## 2020-06-20 NOTE — ROUTINE PROCESS
Bedside and Verbal shift change report given to NEHA Guevara (oncoming nurse) by Karl Rubin (offgoing nurse). Report included the following information SBAR and Kardex.

## 2020-06-20 NOTE — PROGRESS NOTES
Daily Progress Note: 6/20/2020  Any Chanel MD    Assessment/Plan:    SIRS (systemic inflammatory response syndrome)/Sepsis due to UTI (E Coli) with 4/4 SIRS  - treat as below     Hypothermia: likely environmental as patient was found in her yard, leukocytosis, tachycardia, tachypnea. Likely from UTI. Not severe sepsis. Lactate WNR. Also noting diarrhea. C. Diff not obtained due to stools becoming more firm. IV CTX and IV Flagyl initially - then to CTX. UTI  - gram neg compa in urine (E Coli) and blood  - initial coverage with Rocephin and Flagyl - to Rocephin 6/18       Fall: found down in her yard by neighbor, soiled in her own urine and feces due to inability to get up. -CM consult  -pt declining to consider rehab    Atrial flutter with RVR:   - Continue metoprolol and Eliquis. Cardiology consulted and adjusting BB. TTE ok      Cirrhosis:  Denies ETOH use. GI consulted      Renal insufficiency / Rhabdomyolysis: continue IVF. Follow BMP. CK improved      Rib fracture: pain control, incentive spirometer      Lung nodules: will need outpatient follow up      Hypothyroidism: TSH low.  Synthroid held for now.       HTN (hypertension):  Cont metoprolol otherwise hold other antihypertensives or meds as per Cardiology orders    Chronic Low back pain  - tx as needed      Code Status: FULL     Problem List:  Problem List as of 6/20/2020 Date Reviewed: 6/16/2020          Codes Class Noted - Resolved    Cirrhosis ([facility] ICD-10-CM: K74.60  ICD-9-CM: 571.5  6/16/2020 - Present        Transaminitis ICD-10-CM: R74.0  ICD-9-CM: 790.4  6/16/2020 - Present        Lung nodules ICD-10-CM: R91.8  ICD-9-CM: 793.19  6/16/2020 - Present        Atrial flutter ([facility] ICD-10-CM: J76.12  ICD-9-CM: 427.32  6/16/2020 - Present        HTN (hypertension) ICD-10-CM: I10  ICD-9-CM: 401.9  6/16/2020 - Present        Renal insufficiency ICD-10-CM: N28.9  ICD-9-CM: 593.9  6/16/2020 - Present        Rhabdomyolysis ICD-10-CM: K85.46  ICD-9-CM: 728.88  6/16/2020 - Present        SIRS (systemic inflammatory response syndrome) ([facility] ICD-10-CM: R65.10  ICD-9-CM: 995.90  6/16/2020 - Present        Hypothermia ICD-10-CM: T68. [place]  ICD-9-CM: 991.6  6/16/2020 - Present        Rib fracture ICD-10-CM: S22.39XA  ICD-9-CM: 807.00  6/16/2020 - Present        Sepsis ([facility] ICD-10-CM: A41.9  ICD-9-CM: 038.9, 995.91  6/16/2020 - Present        Hypothyroidism ICD-10-CM: E03.9  ICD-9-CM: 244.9  6/16/2020 - Present            Subjective:    75 y.o. female w/ hx of AF, HTN who presents with fall. Patient was found down in her yard by her neighbor, after lying there for 2 days. She could not provide details of the fall, but does report that she was too weak to get up to get help. She does report recent diarrhea, urinary incontinence however otherwise denies fevers of chills, abdominal pain, n/v, CP, SOB, cough, HA. She thought she had dizziness. ED workup showed SIRS criteria, UTI, elevated liver enzymes, and elevated CK. Ms. Claudette Bridges is admitted for further evaluation and management. (Dr Koby Luna)    6/17:  Still having mult loose stools and episodic crampy ab pain. C diff pending when able to collect - nurses report majority of her stools are semi-soft to loose. Her major complaint is low back pain which is chronic. BPs better today so can restart her usual pain meds. Looks like she is in and out of A flutter - Cards to see. CK 1300. WBC better at 16K.      6/18:  Fewer stools and stools are soft. Urine and blood with gram neg rods - continuing Rocephin and Flagyl for now. WBC better at 12K. Afeb. CK down to 421. Cards has seen. 6/19:  C/O \"pain all over\" and cannot be more specific. She wants more narcotics - advised to try to hold at current levels of pain meds but since Creat is ok will give a couple doses of Toradol prn. E Coli in blood and urine - sens to Rocephin - will do another day of IV and then to po med tomorrow.   Cards titrating Metoprolol to control rate and recommends DC of Synthroid for now and recheck TSH in 6 wks to determine need to restart it. Since she is moving so slowly, recommended rehab but she is opposed. :   Still c/o pain \"all over\" but now mostly in back. Advised Ortho consult but she declines. Only wants more pain meds. Still very slow. Will cont Rocephin IV for now. Sed rate 50. Heart a little slower in the 100's. Will try a short course of steroids. Review of Systems:   A comprehensive review of systems was negative except for that written in the HPI. Objective:   Physical Exam:   Visit Vitals  /65 (BP 1 Location: Right arm, BP Patient Position: At rest)   Pulse (!) 109   Temp 98.1 °F (36.7 °C)   Resp 20   Ht 5' 8\" (1.727 m)   Wt 110.9 kg (244 lb 7.8 oz)   SpO2 92%   BMI 37.17 kg/m²      O2 Device: Room air  Temp (24hrs), Av.9 °F (36.6 °C), Min:97.4 °F (36.3 °C), Max:98.3 °F (36.8 °C)    No intake/output data recorded.  1901 -  0700  In: 2683.3 [P.O.:500; I.V.:2183.3]  Out: -   General:  Alert, cooperative, elderly, obese, no distress, appears stated age. Head:  Normocephalic, without obvious abnormality, atraumatic. Eyes:  Conjunctivae/corneas clear. EOMs intact. Throat: Lips, mucosa, and tongue moist..   Neck: Supple, symmetrical, trachea midline, no adenopathy, thyroid: no enlargement/tenderness/nodules, no carotid bruit and no JVD. Lungs:   Clear to auscultation bilaterally. Chest wall:  No tenderness or deformity. Heart:  Irregular, rapid at times. no murmur, click, rub or gallop. Abdomen:   Soft, with mild generalized tenderness. No R/G. Bowel sounds active. No masses,  No organomegaly. Extremities: no cyanosis or edema. No calf tenderness or cords. Pulses: 2+ and symmetric all extremities. Skin: Skin color, texture, turgor normal. No rashes. Minor abrasions on knees are healing well. Neurologic: CNII-XII intact.   Alert and oriented X 3.  equal.  No cogwheeling or rigidity. Gait not tested at this time. Sensation grossly normal to touch. Gross motor of extremities normal.       Data Review:     LIMITED ULTRASOUND OF THE ABDOMEN   6/17/20  INDICATION: Cirrhosis with common bile duct dilation  COMPARISON: CT chest abdomen pelvis 6/16/2020. TECHNIQUE:  Limited sonography of the abdomen was performed. FINDINGS:  LIVER: Mild intrahepatic biliary ductal dilation. No overt cirrhosis. The liver  is incompletely imaged. No mass is shown in the imaged portions. MAIN PORTAL VEIN: Patent. Appropriate hepatopetal flow. GALLBLADDER: Surgically absent. COMMON DUCT: 10 mm in diameter. Dilated. PANCREAS: A small portion of the visualized proximal pancreas is normal; the  remainder is obscured. SPLEEN: Not shown. RIGHT KIDNEY: 10.8 cm in length. No hydronephrosis, shadowing calculus, or  contour-deforming renal mass. LEFT KIDNEY: Not shown. AORTA: Normal caliber in its visualized portions. INFERIOR VENA CAVA: Normal caliber in its visualized portions. IMPRESSION:   Biliary ductal dilation. CT Chest/Ab/Pelvis 6/16/20  INDICATION: Tachypnea, cough, ground-level fall 2 days ago, hypothermia. Back  and left hip pain  COMPARISON: None  CONTRAST: None.   FINDINGS:  CHEST WALL: No mass or axillary lymphadenopathy. THYROID: No nodule. MEDIASTINUM: No mass or lymphadenopathy. GELA: No mass or lymphadenopathy. THORACIC AORTA: No aneurysm. MAIN PULMONARY ARTERY: Normal in caliber. TRACHEA/BRONCHI: Patent. ESOPHAGUS: No wall thickening or dilatation. HEART: Normal in size. PLEURA: No effusion or pneumothorax. LUNGS: 5 mm right upper lobe nodule (series 2, image 15). 2 mm left upper lobe  nodule (image 13). 2 mm right upper lobe nodule (series 2, image 29). The absence of intravenous contrast material reduces the sensitivity for  evaluation of the solid parenchymal organs of the abdomen.   No focal  abnormalities are seen within the liver, spleen, pancreas or adrenal glands or  kidneys. No renal or ureteral calculus or obstruction is identified. Cholecystectomy clips are present. There is a left renal 5.5 cm cyst with  Hounsfield unit measurement of 6.4 (axial image 71). The liver is macrolobulated  with mild central biliary prominence. The aorta is atherosclerotic but tapers without aneurysm. There is no  retroperitoneal adenopathy or mass. The bowel is not obstructed. There are multiple sigmoid diverticula. The  appendix is normal on axial image 101. There is no ascites or free  intraperitoneal air. The uterus is small. There is no pelvic mass or adenopathy. The right total hip replacement in place creates a moderate amount of streak  artifact. There is disc space narrowing at multiple lumbar levels. There is an acute left lateral 11th rib fracture (sagittal image 115)  IMPRESSION:   Pulmonary micronodules  Incidental sigmoid diverticulosis. Cirrhosis with mild intrahepatic biliary dilatation. EXAM:  CT CERVICAL SPINE WITHOUT CONTRAST 6/16/20  INDICATION: Limited ROM, pain, GLF.  COMPARISON: None. CONTRAST:  None.   FINDINGS:  There is no acute fracture or dislocation. C5-6 and C6-7 show some prominent  change of disc degeneration and spondylosis of.  Prominent facet hypertrophy seen on the right at C3-C4. On the left at C4-C5. Bilateral foraminal narrowing seen at C5-6 and C6-7. Mild canal stenosis seen at  those 2 level by spur. IMPRESSION: Prominent spondylosis, no acute findings seen. ECHO 6/18/20  Interpretation Summary   Result status: Final result   · Image quality for this study was suboptimal.  · Normal cavity size and systolic function (ejection fraction normal). Mildly increased wall thickness. Calculated left ventricular ejection fraction is 55%. · Not well visualized. · Probably trileaflet aortic valve. Aortic valve sclerosis. · Appears normal. Mild mitral annular calcification.  Did not appreciate any MR in the images provided. · Mild tricuspid valve regurgitation is present. Recent Days:  Recent Labs     06/20/20  0111 06/19/20  0327 06/18/20  0314   WBC 10.9 12.7* 12.0*   HGB 9.9* 10.6* 10.5*   HCT 32.3* 34.4* 33.7*    392 360     Recent Labs     06/20/20  0111 06/19/20  0327 06/18/20  0314    143 143   K 3.7 4.0 3.6   * 111* 113*   CO2 29 28 25   * 141* 155*   BUN 14 15 19   CREA 0.92 0.94 0.82   CA 8.6 8.8 8.4*   MG  --   --  2.2   ALB 2.5* 2.8* 2.6*   TBILI 0.3 0.4 0.4   ALT 61 71 74       24 Hour Results:  Recent Results (from the past 24 hour(s))   CK    Collection Time: 06/20/20  1:11 AM   Result Value Ref Range     26 - 192 U/L   CBC WITH AUTOMATED DIFF    Collection Time: 06/20/20  1:11 AM   Result Value Ref Range    WBC 10.9 3.6 - 11.0 K/uL    RBC 3.79 (L) 3.80 - 5.20 M/uL    HGB 9.9 (L) 11.5 - 16.0 g/dL    HCT 32.3 (L) 35.0 - 47.0 %    MCV 85.2 80.0 - 99.0 FL    MCH 26.1 26.0 - 34.0 PG    MCHC 30.7 30.0 - 36.5 g/dL    RDW 16.6 (H) 11.5 - 14.5 %    PLATELET 942 748 - 690 K/uL    MPV 10.0 8.9 - 12.9 FL    NRBC 0.4 (H) 0  WBC    ABSOLUTE NRBC 0.04 (H) 0.00 - 0.01 K/uL    NEUTROPHILS 68 32 - 75 %    BAND NEUTROPHILS 1 %    LYMPHOCYTES 21 12 - 49 %    MONOCYTES 5 5 - 13 %    EOSINOPHILS 1 0 - 7 %    BASOPHILS 0 0 - 1 %    METAMYELOCYTES 3 %    MYELOCYTES 1 %    IMMATURE GRANULOCYTES 0 0.0 - 0.5 %    ABS. NEUTROPHILS 7.5 1.8 - 8.0 K/UL    ABS. LYMPHOCYTES 2.3 0.8 - 3.5 K/UL    ABS. MONOCYTES 0.5 0.0 - 1.0 K/UL    ABS. EOSINOPHILS 0.1 0.0 - 0.4 K/UL    ABS. BASOPHILS 0.0 0.0 - 0.1 K/UL    ABS. IMM.  GRANS. 0.0 0.00 - 0.04 K/UL    DF SMEAR SCANNED      RBC COMMENTS ANISOCYTOSIS  1+        RBC COMMENTS OVALOCYTES  PRESENT        RBC COMMENTS POLYCHROMASIA  PRESENT        WBC COMMENTS TOXIC GRANULATION     METABOLIC PANEL, COMPREHENSIVE    Collection Time: 06/20/20  1:11 AM   Result Value Ref Range    Sodium 143 136 - 145 mmol/L    Potassium 3.7 3.5 - 5.1 mmol/L    Chloride 109 (H) 97 - 108 mmol/L    CO2 29 21 - 32 mmol/L    Anion gap 5 5 - 15 mmol/L    Glucose 183 (H) 65 - 100 mg/dL    BUN 14 6 - 20 MG/DL    Creatinine 0.92 0.55 - 1.02 MG/DL    BUN/Creatinine ratio 15 12 - 20      GFR est AA >60 >60 ml/min/1.73m2    GFR est non-AA 60 (L) >60 ml/min/1.73m2    Calcium 8.6 8.5 - 10.1 MG/DL    Bilirubin, total 0.3 0.2 - 1.0 MG/DL    ALT (SGPT) 61 12 - 78 U/L    AST (SGOT) 28 15 - 37 U/L    Alk. phosphatase 79 45 - 117 U/L    Protein, total 5.9 (L) 6.4 - 8.2 g/dL    Albumin 2.5 (L) 3.5 - 5.0 g/dL    Globulin 3.4 2.0 - 4.0 g/dL    A-G Ratio 0.7 (L) 1.1 - 2.2         Medications reviewed  Current Facility-Administered Medications   Medication Dose Route Frequency    metoprolol tartrate (LOPRESSOR) tablet 50 mg  50 mg Oral Q6H    gabapentin (NEURONTIN) capsule 300 mg  300 mg Oral TID    HYDROcodone-acetaminophen (NORCO)  mg tablet 1 Tab  1 Tab Oral Q6H PRN    sodium chloride (NS) flush 5-40 mL  5-40 mL IntraVENous Q8H    sodium chloride (NS) flush 5-40 mL  5-40 mL IntraVENous PRN    acetaminophen (TYLENOL) tablet 650 mg  650 mg Oral Q6H PRN    naloxone (NARCAN) injection 0.4 mg  0.4 mg IntraVENous PRN    cefTRIAXone (ROCEPHIN) 2 g in 0.9% sodium chloride (MBP/ADV) 50 mL  2 g IntraVENous Q24H    apixaban (ELIQUIS) tablet 5 mg  5 mg Oral BID    pantoprazole (PROTONIX) tablet 40 mg  40 mg Oral ACB    pravastatin (PRAVACHOL) tablet 20 mg  20 mg Oral QHS    lactated Ringers infusion  100 mL/hr IntraVENous CONTINUOUS       Care Plan discussed with: Patient and Nurse    Total time spent with patient: 30 minutes.     Rufino Fermin MD

## 2020-06-20 NOTE — PROGRESS NOTES
6/20/2020  1:11 PM  Case management note    Son called Haydee Leung  [phone]. He stated patient could not go home alone and family could not provide 24 hour care. I spoke with patient and she wants to go to [facility]. Since [facility] moved, we sent referral to lorena Ford. I explained that sometimes insurance will not cover and would need to go to snf to do rehab. Her and son both understood this. [facility] SNF would be her back up plan if needed.  I encouraged her to participate with PT/OT  Will continue to follow  Wolf Montez

## 2020-06-21 ENCOUNTER — APPOINTMENT (OUTPATIENT)
Dept: GENERAL RADIOLOGY | Age: 75
DRG: 872 | End: 2020-06-21
Attending: FAMILY MEDICINE
Payer: MEDICARE

## 2020-06-21 LAB
ALBUMIN SERPL-MCNC: 3 G/DL (ref 3.5–5)
ALBUMIN/GLOB SERPL: 0.8 {RATIO} (ref 1.1–2.2)
ALP SERPL-CCNC: 96 U/L (ref 45–117)
ALT SERPL-CCNC: 66 U/L (ref 12–78)
ANION GAP SERPL CALC-SCNC: 8 MMOL/L (ref 5–15)
AST SERPL-CCNC: 22 U/L (ref 15–37)
BASOPHILS # BLD: 0 K/UL (ref 0–0.1)
BASOPHILS NFR BLD: 0 % (ref 0–1)
BILIRUB SERPL-MCNC: 0.4 MG/DL (ref 0.2–1)
BUN SERPL-MCNC: 14 MG/DL (ref 6–20)
BUN/CREAT SERPL: 12 (ref 12–20)
CALCIUM SERPL-MCNC: 9.1 MG/DL (ref 8.5–10.1)
CHLORIDE SERPL-SCNC: 103 MMOL/L (ref 97–108)
CK SERPL-CCNC: 92 U/L (ref 26–192)
CO2 SERPL-SCNC: 27 MMOL/L (ref 21–32)
CREAT SERPL-MCNC: 1.17 MG/DL (ref 0.55–1.02)
DIFFERENTIAL METHOD BLD: ABNORMAL
EOSINOPHIL # BLD: 0 K/UL (ref 0–0.4)
EOSINOPHIL NFR BLD: 0 % (ref 0–7)
ERYTHROCYTE [DISTWIDTH] IN BLOOD BY AUTOMATED COUNT: 16.3 % (ref 11.5–14.5)
GLOBULIN SER CALC-MCNC: 3.8 G/DL (ref 2–4)
GLUCOSE SERPL-MCNC: 359 MG/DL (ref 65–100)
HCT VFR BLD AUTO: 34.9 % (ref 35–47)
HGB BLD-MCNC: 10.8 G/DL (ref 11.5–16)
IMM GRANULOCYTES # BLD AUTO: 0 K/UL
IMM GRANULOCYTES NFR BLD AUTO: 0 %
LYMPHOCYTES # BLD: 0.8 K/UL (ref 0.8–3.5)
LYMPHOCYTES NFR BLD: 5 % (ref 12–49)
MCH RBC QN AUTO: 26.4 PG (ref 26–34)
MCHC RBC AUTO-ENTMCNC: 30.9 G/DL (ref 30–36.5)
MCV RBC AUTO: 85.3 FL (ref 80–99)
MONOCYTES # BLD: 0.5 K/UL (ref 0–1)
MONOCYTES NFR BLD: 3 % (ref 5–13)
MYELOCYTES NFR BLD MANUAL: 3 %
NEUTS BAND NFR BLD MANUAL: 2 % (ref 0–6)
NEUTS SEG # BLD: 13.6 K/UL (ref 1.8–8)
NEUTS SEG NFR BLD: 87 % (ref 32–75)
NRBC # BLD: 0.02 K/UL (ref 0–0.01)
NRBC BLD-RTO: 0.1 PER 100 WBC
PLATELET # BLD AUTO: 439 K/UL (ref 150–400)
PMV BLD AUTO: 9.8 FL (ref 8.9–12.9)
POTASSIUM SERPL-SCNC: 3.9 MMOL/L (ref 3.5–5.1)
PROT SERPL-MCNC: 6.8 G/DL (ref 6.4–8.2)
RBC # BLD AUTO: 4.09 M/UL (ref 3.8–5.2)
RBC MORPH BLD: ABNORMAL
SODIUM SERPL-SCNC: 138 MMOL/L (ref 136–145)
WBC # BLD AUTO: 15.3 K/UL (ref 3.6–11)

## 2020-06-21 PROCEDURE — 74011250636 HC RX REV CODE- 250/636: Performed by: FAMILY MEDICINE

## 2020-06-21 PROCEDURE — 80053 COMPREHEN METABOLIC PANEL: CPT

## 2020-06-21 PROCEDURE — 74011000258 HC RX REV CODE- 258: Performed by: INTERNAL MEDICINE

## 2020-06-21 PROCEDURE — 65660000000 HC RM CCU STEPDOWN

## 2020-06-21 PROCEDURE — 82550 ASSAY OF CK (CPK): CPT

## 2020-06-21 PROCEDURE — 74011250636 HC RX REV CODE- 250/636: Performed by: INTERNAL MEDICINE

## 2020-06-21 PROCEDURE — 74011250637 HC RX REV CODE- 250/637: Performed by: FAMILY MEDICINE

## 2020-06-21 PROCEDURE — 77030027138 HC INCENT SPIROMETER -A

## 2020-06-21 PROCEDURE — 85025 COMPLETE CBC W/AUTO DIFF WBC: CPT

## 2020-06-21 PROCEDURE — 74011250637 HC RX REV CODE- 250/637: Performed by: STUDENT IN AN ORGANIZED HEALTH CARE EDUCATION/TRAINING PROGRAM

## 2020-06-21 PROCEDURE — 74011250637 HC RX REV CODE- 250/637: Performed by: INTERNAL MEDICINE

## 2020-06-21 PROCEDURE — 36415 COLL VENOUS BLD VENIPUNCTURE: CPT

## 2020-06-21 PROCEDURE — 71045 X-RAY EXAM CHEST 1 VIEW: CPT

## 2020-06-21 RX ORDER — KETOROLAC TROMETHAMINE 30 MG/ML
15 INJECTION, SOLUTION INTRAMUSCULAR; INTRAVENOUS
Status: COMPLETED | OUTPATIENT
Start: 2020-06-21 | End: 2020-06-21

## 2020-06-21 RX ORDER — OXYCODONE AND ACETAMINOPHEN 10; 325 MG/1; MG/1
1 TABLET ORAL
Status: DISCONTINUED | OUTPATIENT
Start: 2020-06-21 | End: 2020-06-23 | Stop reason: HOSPADM

## 2020-06-21 RX ADMIN — GABAPENTIN 300 MG: 300 CAPSULE ORAL at 09:23

## 2020-06-21 RX ADMIN — Medication 10 ML: at 13:05

## 2020-06-21 RX ADMIN — METOPROLOL TARTRATE 50 MG: 50 TABLET, FILM COATED ORAL at 23:43

## 2020-06-21 RX ADMIN — APIXABAN 5 MG: 5 TABLET, FILM COATED ORAL at 09:23

## 2020-06-21 RX ADMIN — HYDROCODONE BITARTRATE AND ACETAMINOPHEN 1 TABLET: 10; 325 TABLET ORAL at 06:45

## 2020-06-21 RX ADMIN — Medication 10 ML: at 05:34

## 2020-06-21 RX ADMIN — HYDROCODONE BITARTRATE AND ACETAMINOPHEN 1 TABLET: 10; 325 TABLET ORAL at 00:53

## 2020-06-21 RX ADMIN — Medication 10 ML: at 01:23

## 2020-06-21 RX ADMIN — PANTOPRAZOLE SODIUM 40 MG: 40 TABLET, DELAYED RELEASE ORAL at 06:45

## 2020-06-21 RX ADMIN — KETOROLAC TROMETHAMINE 15 MG: 30 INJECTION, SOLUTION INTRAMUSCULAR at 01:22

## 2020-06-21 RX ADMIN — PRAVASTATIN SODIUM 20 MG: 20 TABLET ORAL at 20:53

## 2020-06-21 RX ADMIN — OXYCODONE AND ACETAMINOPHEN 1 TABLET: 10; 325 TABLET ORAL at 12:08

## 2020-06-21 RX ADMIN — GABAPENTIN 300 MG: 300 CAPSULE ORAL at 20:52

## 2020-06-21 RX ADMIN — HYDROCODONE BITARTRATE AND ACETAMINOPHEN 1 TABLET: 10; 325 TABLET ORAL at 16:27

## 2020-06-21 RX ADMIN — APIXABAN 5 MG: 5 TABLET, FILM COATED ORAL at 17:21

## 2020-06-21 RX ADMIN — OXYCODONE AND ACETAMINOPHEN 1 TABLET: 10; 325 TABLET ORAL at 20:52

## 2020-06-21 RX ADMIN — GABAPENTIN 300 MG: 300 CAPSULE ORAL at 16:27

## 2020-06-21 RX ADMIN — METHYLPREDNISOLONE SODIUM SUCCINATE 125 MG: 125 INJECTION, POWDER, FOR SOLUTION INTRAMUSCULAR; INTRAVENOUS at 05:33

## 2020-06-21 RX ADMIN — SODIUM CHLORIDE, SODIUM LACTATE, POTASSIUM CHLORIDE, AND CALCIUM CHLORIDE 100 ML/HR: 600; 310; 30; 20 INJECTION, SOLUTION INTRAVENOUS at 06:58

## 2020-06-21 RX ADMIN — METOPROLOL TARTRATE 50 MG: 50 TABLET, FILM COATED ORAL at 12:01

## 2020-06-21 RX ADMIN — CEFTRIAXONE 2 G: 2 INJECTION, POWDER, FOR SOLUTION INTRAMUSCULAR; INTRAVENOUS at 14:55

## 2020-06-21 RX ADMIN — METOPROLOL TARTRATE 50 MG: 50 TABLET, FILM COATED ORAL at 05:33

## 2020-06-21 RX ADMIN — METOPROLOL TARTRATE 50 MG: 50 TABLET, FILM COATED ORAL at 17:22

## 2020-06-21 RX ADMIN — Medication 10 ML: at 20:53

## 2020-06-21 NOTE — PROGRESS NOTES
Daily Progress Note: 6/21/2020  Swetha Villarreal MD    Assessment/Plan:    SIRS (systemic inflammatory response syndrome)/Sepsis due to UTI (E Coli) with 4/4 SIRS  - treat as below     Hypothermia: likely environmental as patient was found in her yard, leukocytosis, tachycardia, tachypnea. Likely from UTI. Not severe sepsis. Lactate WNR. Also noting diarrhea. C. Diff not obtained due to stools becoming more firm. IV CTX and IV Flagyl initially - then to CTX. UTI  - gram neg compa in urine (E Coli) and blood  - initial coverage with Rocephin and Flagyl - to Rocephin 6/18       Fall: found down in her yard by neighbor, soiled in her own urine and feces due to inability to get up. -CM consult  -pt declining to consider rehab    Atrial flutter with RVR:   - Continue metoprolol and Eliquis. Cardiology consulted and adjusting BB. TTE ok      Cirrhosis:  Denies ETOH use. GI consulted      Renal insufficiency / Rhabdomyolysis: continue IVF. Follow BMP. CK improved      Rib fracture: pain control, incentive spirometer      Lung nodules: will need outpatient follow up      Hypothyroidism: TSH low.  Synthroid held for now.       HTN (hypertension):  Cont metoprolol otherwise hold other antihypertensives or meds as per Cardiology orders    Chronic Low back pain  - tx as needed      Code Status: FULL     Problem List:  Problem List as of 6/21/2020 Date Reviewed: 6/16/2020          Codes Class Noted - Resolved    Cirrhosis ([facility] ICD-10-CM: K74.60  ICD-9-CM: 571.5  6/16/2020 - Present        Transaminitis ICD-10-CM: R74.0  ICD-9-CM: 790.4  6/16/2020 - Present        Lung nodules ICD-10-CM: R91.8  ICD-9-CM: 793.19  6/16/2020 - Present        Atrial flutter ([facility] ICD-10-CM: K24.25  ICD-9-CM: 427.32  6/16/2020 - Present        HTN (hypertension) ICD-10-CM: I10  ICD-9-CM: 401.9  6/16/2020 - Present        Renal insufficiency ICD-10-CM: N28.9  ICD-9-CM: 593.9  6/16/2020 - Present        Rhabdomyolysis ICD-10-CM: R25.74  ICD-9-CM: 728.88  6/16/2020 - Present        SIRS (systemic inflammatory response syndrome) ([facility] ICD-10-CM: R65.10  ICD-9-CM: 995.90  6/16/2020 - Present        Hypothermia ICD-10-CM: T68. Valentin   ICD-9-CM: 991.6  6/16/2020 - Present        Rib fracture ICD-10-CM: S22.39XA  ICD-9-CM: 807.00  6/16/2020 - Present        Sepsis ([facility] ICD-10-CM: A41.9  ICD-9-CM: 038.9, 995.91  6/16/2020 - Present        Hypothyroidism ICD-10-CM: E03.9  ICD-9-CM: 244.9  6/16/2020 - Present            Subjective:    75 y.o. female w/ hx of AF, HTN who presents with fall. Patient was found down in her yard by her neighbor, after lying there for 2 days. She could not provide details of the fall, but does report that she was too weak to get up to get help. She does report recent diarrhea, urinary incontinence however otherwise denies fevers of chills, abdominal pain, n/v, CP, SOB, cough, HA. She thought she had dizziness. ED workup showed SIRS criteria, UTI, elevated liver enzymes, and elevated CK. Ms. Jamarcus Mejia is admitted for further evaluation and management. (Dr Long Jamison)    6/17:  Still having mult loose stools and episodic crampy ab pain. C diff pending when able to collect - nurses report majority of her stools are semi-soft to loose. Her major complaint is low back pain which is chronic. BPs better today so can restart her usual pain meds. Looks like she is in and out of A flutter - Cards to see. CK 1300. WBC better at 16K.      6/18:  Fewer stools and stools are soft. Urine and blood with gram neg rods - continuing Rocephin and Flagyl for now. WBC better at 12K. Afeb. CK down to 421. Cards has seen. 6/19:  C/O \"pain all over\" and cannot be more specific. She wants more narcotics - advised to try to hold at current levels of pain meds but since Creat is ok will give a couple doses of Toradol prn. E Coli in blood and urine - sens to Rocephin - will do another day of IV and then to po med tomorrow.   Cards titrating Metoprolol to control rate and recommends DC of Synthroid for now and recheck TSH in 6 wks to determine need to restart it. Since she is moving so slowly, recommended rehab but she is opposed. :   Still c/o pain \"all over\" but now mostly in back. Advised Ortho consult but she declines. Only wants more pain meds. Still very slow. Will cont Rocephin IV for now. Sed rate 50. Heart a little slower in the 100's. Will try a short course of steroids. :  Pain in right chest from rib fx worse with movement she reports to the point \"can't stand it if I move or twist.\"   Wants more pain meds - will change to Percocet. Now agrees to go to rehab. WBC up from the steroids. Does report back is better after the steroids. Still declines to have Ortho see her. Review of Systems:   A comprehensive review of systems was negative except for that written in the HPI. Objective:   Physical Exam:   Visit Vitals  /84 (BP 1 Location: Right arm, BP Patient Position: At rest)   Pulse 100   Temp 98 °F (36.7 °C)   Resp 20   Ht 5' 8\" (1.727 m)   Wt 105.6 kg (232 lb 12.9 oz)   SpO2 95%   BMI 35.40 kg/m²      O2 Device: Room air  Temp (24hrs), Av °F (36.7 °C), Min:97.6 °F (36.4 °C), Max:98.5 °F (36.9 °C)    No intake/output data recorded.  1901 -  0700  In: 2840 [P.O.:690; I.V.:2150]  Out: 650 [Urine:650]  General:  Alert, cooperative, elderly, obese, no distress, appears stated age. Head:  Normocephalic, without obvious abnormality, atraumatic. Eyes:  Conjunctivae/corneas clear. EOMs intact. Throat: Lips, mucosa, and tongue moist..   Neck: Supple, symmetrical, trachea midline, no adenopathy, thyroid: no enlargement/tenderness/nodules, no carotid bruit and no JVD. Lungs:   Clear to auscultation bilaterally. Chest wall:  No tenderness or deformity except tender over area of right rib fx. Heart:  Irregular, rapid at times. no murmur, click, rub or gallop.    Abdomen: Soft, with mild generalized tenderness. No R/G. Bowel sounds active. No masses,  No organomegaly. Extremities: no cyanosis or edema. No calf tenderness or cords. Pulses: 2+ and symmetric all extremities. Skin: Skin color, texture, turgor normal. No rashes. Minor abrasions on knees, elbows are healing well. Neurologic:   Alert and oriented X 3.  equal.  No cogwheeling or rigidity. Gait not tested at this time. Sensation grossly normal to touch. Gross motor of extremities normal.       Data Review:     LIMITED ULTRASOUND OF THE ABDOMEN   6/17/20  INDICATION: Cirrhosis with common bile duct dilation  COMPARISON: CT chest abdomen pelvis 6/16/2020. TECHNIQUE:  Limited sonography of the abdomen was performed. FINDINGS:  LIVER: Mild intrahepatic biliary ductal dilation. No overt cirrhosis. The liver  is incompletely imaged. No mass is shown in the imaged portions. MAIN PORTAL VEIN: Patent. Appropriate hepatopetal flow. GALLBLADDER: Surgically absent. COMMON DUCT: 10 mm in diameter. Dilated. PANCREAS: A small portion of the visualized proximal pancreas is normal; the  remainder is obscured. SPLEEN: Not shown. RIGHT KIDNEY: 10.8 cm in length. No hydronephrosis, shadowing calculus, or  contour-deforming renal mass. LEFT KIDNEY: Not shown. AORTA: Normal caliber in its visualized portions. INFERIOR VENA CAVA: Normal caliber in its visualized portions. IMPRESSION:   Biliary ductal dilation. CT Chest/Ab/Pelvis 6/16/20  INDICATION: Tachypnea, cough, ground-level fall 2 days ago, hypothermia. Back  and left hip pain  COMPARISON: None  CONTRAST: None.   FINDINGS:  CHEST WALL: No mass or axillary lymphadenopathy. THYROID: No nodule. MEDIASTINUM: No mass or lymphadenopathy. EGLA: No mass or lymphadenopathy. THORACIC AORTA: No aneurysm. MAIN PULMONARY ARTERY: Normal in caliber. TRACHEA/BRONCHI: Patent. ESOPHAGUS: No wall thickening or dilatation. HEART: Normal in size.   PLEURA: No effusion or pneumothorax. LUNGS: 5 mm right upper lobe nodule (series 2, image 15). 2 mm left upper lobe  nodule (image 13). 2 mm right upper lobe nodule (series 2, image 29). The absence of intravenous contrast material reduces the sensitivity for  evaluation of the solid parenchymal organs of the abdomen. No focal  abnormalities are seen within the liver, spleen, pancreas or adrenal glands or  kidneys. No renal or ureteral calculus or obstruction is identified. Cholecystectomy clips are present. There is a left renal 5.5 cm cyst with  Hounsfield unit measurement of 6.4 (axial image 71). The liver is macrolobulated  with mild central biliary prominence. The aorta is atherosclerotic but tapers without aneurysm. There is no  retroperitoneal adenopathy or mass. The bowel is not obstructed. There are multiple sigmoid diverticula. The  appendix is normal on axial image 101. There is no ascites or free  intraperitoneal air. The uterus is small. There is no pelvic mass or adenopathy. The right total hip replacement in place creates a moderate amount of streak  artifact. There is disc space narrowing at multiple lumbar levels. There is an acute left lateral 11th rib fracture (sagittal image 115)  IMPRESSION:   Pulmonary micronodules  Incidental sigmoid diverticulosis. Cirrhosis with mild intrahepatic biliary dilatation. EXAM:  CT CERVICAL SPINE WITHOUT CONTRAST 6/16/20  INDICATION: Limited ROM, pain, GLF.  COMPARISON: None. CONTRAST:  None.   FINDINGS:  There is no acute fracture or dislocation. C5-6 and C6-7 show some prominent  change of disc degeneration and spondylosis of.  Prominent facet hypertrophy seen on the right at C3-C4. On the left at C4-C5. Bilateral foraminal narrowing seen at C5-6 and C6-7. Mild canal stenosis seen at  those 2 level by spur. IMPRESSION: Prominent spondylosis, no acute findings seen.     ECHO 6/18/20  Interpretation Summary   Result status: Final result   · Image quality for this study was suboptimal.  · Normal cavity size and systolic function (ejection fraction normal). Mildly increased wall thickness. Calculated left ventricular ejection fraction is 55%. · Not well visualized. · Probably trileaflet aortic valve. Aortic valve sclerosis. · Appears normal. Mild mitral annular calcification. Did not appreciate any MR in the images provided. · Mild tricuspid valve regurgitation is present. Recent Days:  Recent Labs     06/21/20  0525 06/20/20  0111 06/19/20  0327   WBC 15.3* 10.9 12.7*   HGB 10.8* 9.9* 10.6*   HCT 34.9* 32.3* 34.4*   * 391 392     Recent Labs     06/21/20  0525 06/20/20  0111 06/19/20  0327    143 143   K 3.9 3.7 4.0    109* 111*   CO2 27 29 28   * 183* 141*   BUN 14 14 15   CREA 1.17* 0.92 0.94   CA 9.1 8.6 8.8   ALB 3.0* 2.5* 2.8*   TBILI 0.4 0.3 0.4   ALT 66 61 71       24 Hour Results:  Recent Results (from the past 24 hour(s))   CK    Collection Time: 06/21/20  5:25 AM   Result Value Ref Range    CK 92 26 - 192 U/L   CBC WITH AUTOMATED DIFF    Collection Time: 06/21/20  5:25 AM   Result Value Ref Range    WBC 15.3 (H) 3.6 - 11.0 K/uL    RBC 4.09 3.80 - 5.20 M/uL    HGB 10.8 (L) 11.5 - 16.0 g/dL    HCT 34.9 (L) 35.0 - 47.0 %    MCV 85.3 80.0 - 99.0 FL    MCH 26.4 26.0 - 34.0 PG    MCHC 30.9 30.0 - 36.5 g/dL    RDW 16.3 (H) 11.5 - 14.5 %    PLATELET 836 (H) 353 - 400 K/uL    MPV 9.8 8.9 - 12.9 FL    NRBC 0.1 (H) 0  WBC    ABSOLUTE NRBC 0.02 (H) 0.00 - 0.01 K/uL    NEUTROPHILS 87 (H) 32 - 75 %    BAND NEUTROPHILS 2 0 - 6 %    LYMPHOCYTES 5 (L) 12 - 49 %    MONOCYTES 3 (L) 5 - 13 %    EOSINOPHILS 0 0 - 7 %    BASOPHILS 0 0 - 1 %    MYELOCYTES 3 (H) 0 %    IMMATURE GRANULOCYTES 0 %    ABS. NEUTROPHILS 13.6 (H) 1.8 - 8.0 K/UL    ABS. LYMPHOCYTES 0.8 0.8 - 3.5 K/UL    ABS. MONOCYTES 0.5 0.0 - 1.0 K/UL    ABS. EOSINOPHILS 0.0 0.0 - 0.4 K/UL    ABS. BASOPHILS 0.0 0.0 - 0.1 K/UL    ABS. IMM.  GRANS. 0.0 K/UL    DF MANUAL      RBC COMMENTS ANISOCYTOSIS  1+       METABOLIC PANEL, COMPREHENSIVE    Collection Time: 06/21/20  5:25 AM   Result Value Ref Range    Sodium 138 136 - 145 mmol/L    Potassium 3.9 3.5 - 5.1 mmol/L    Chloride 103 97 - 108 mmol/L    CO2 27 21 - 32 mmol/L    Anion gap 8 5 - 15 mmol/L    Glucose 359 (H) 65 - 100 mg/dL    BUN 14 6 - 20 MG/DL    Creatinine 1.17 (H) 0.55 - 1.02 MG/DL    BUN/Creatinine ratio 12 12 - 20      GFR est AA 55 (L) >60 ml/min/1.73m2    GFR est non-AA 45 (L) >60 ml/min/1.73m2    Calcium 9.1 8.5 - 10.1 MG/DL    Bilirubin, total 0.4 0.2 - 1.0 MG/DL    ALT (SGPT) 66 12 - 78 U/L    AST (SGOT) 22 15 - 37 U/L    Alk. phosphatase 96 45 - 117 U/L    Protein, total 6.8 6.4 - 8.2 g/dL    Albumin 3.0 (L) 3.5 - 5.0 g/dL    Globulin 3.8 2.0 - 4.0 g/dL    A-G Ratio 0.8 (L) 1.1 - 2.2         Medications reviewed  Current Facility-Administered Medications   Medication Dose Route Frequency    metoprolol tartrate (LOPRESSOR) tablet 50 mg  50 mg Oral Q6H    gabapentin (NEURONTIN) capsule 300 mg  300 mg Oral TID    HYDROcodone-acetaminophen (NORCO)  mg tablet 1 Tab  1 Tab Oral Q6H PRN    sodium chloride (NS) flush 5-40 mL  5-40 mL IntraVENous Q8H    sodium chloride (NS) flush 5-40 mL  5-40 mL IntraVENous PRN    acetaminophen (TYLENOL) tablet 650 mg  650 mg Oral Q6H PRN    naloxone (NARCAN) injection 0.4 mg  0.4 mg IntraVENous PRN    cefTRIAXone (ROCEPHIN) 2 g in 0.9% sodium chloride (MBP/ADV) 50 mL  2 g IntraVENous Q24H    apixaban (ELIQUIS) tablet 5 mg  5 mg Oral BID    pantoprazole (PROTONIX) tablet 40 mg  40 mg Oral ACB    pravastatin (PRAVACHOL) tablet 20 mg  20 mg Oral QHS    lactated Ringers infusion  100 mL/hr IntraVENous CONTINUOUS     Care Plan discussed with: Patient and Nurse  Total time spent with patient: 30 minutes.   Duc Chowdary MD

## 2020-06-21 NOTE — PROGRESS NOTES
Bedside shift change report given to [place], RN (oncoming nurse) by Lonny Null RN (offgoing nurse). Report included the following information SBAR, Kardex, Procedure Summary, Intake/Output, MAR and Recent Results. Patient stated pain is much better managed today. Was up in chair and to the bathroom multiple times throughout the day.

## 2020-06-21 NOTE — ROUTINE PROCESS
Bedside and Verbal shift change report given to [address] (oncoming nurse) by Day Moran (offgoing nurse). Report included the following information SBAR and Kardex.

## 2020-06-21 NOTE — PROGRESS NOTES
Problem: Falls - Risk of  Goal: *Absence of Falls  Outcome: Progressing Towards Goal  Note: Fall Risk Interventions:  Mobility Interventions: Bed/chair exit alarm, Communicate number of staff needed for ambulation/transfer, Patient to call before getting OOB         Medication Interventions: Bed/chair exit alarm, Patient to call before getting OOB, Teach patient to arise slowly    Elimination Interventions: Bed/chair exit alarm, Call light in reach, Stay With Me (per policy)    History of Falls Interventions: Bed/chair exit alarm

## 2020-06-22 LAB
ALBUMIN SERPL-MCNC: 3 G/DL (ref 3.5–5)
ALBUMIN/GLOB SERPL: 0.9 {RATIO} (ref 1.1–2.2)
ALP SERPL-CCNC: 81 U/L (ref 45–117)
ALT SERPL-CCNC: 55 U/L (ref 12–78)
ANION GAP SERPL CALC-SCNC: 7 MMOL/L (ref 5–15)
AST SERPL-CCNC: 16 U/L (ref 15–37)
BACTERIA SPEC CULT: ABNORMAL
BACTERIA SPEC CULT: ABNORMAL
BASOPHILS # BLD: 0 K/UL (ref 0–0.1)
BASOPHILS NFR BLD: 0 % (ref 0–1)
BILIRUB SERPL-MCNC: 0.4 MG/DL (ref 0.2–1)
BUN SERPL-MCNC: 18 MG/DL (ref 6–20)
BUN/CREAT SERPL: 17 (ref 12–20)
CALCIUM SERPL-MCNC: 8.7 MG/DL (ref 8.5–10.1)
CHLORIDE SERPL-SCNC: 100 MMOL/L (ref 97–108)
CK SERPL-CCNC: 56 U/L (ref 26–192)
CO2 SERPL-SCNC: 29 MMOL/L (ref 21–32)
CREAT SERPL-MCNC: 1.03 MG/DL (ref 0.55–1.02)
DIFFERENTIAL METHOD BLD: ABNORMAL
EOSINOPHIL # BLD: 0 K/UL (ref 0–0.4)
EOSINOPHIL NFR BLD: 0 % (ref 0–7)
ERYTHROCYTE [DISTWIDTH] IN BLOOD BY AUTOMATED COUNT: 16.7 % (ref 11.5–14.5)
GLOBULIN SER CALC-MCNC: 3.3 G/DL (ref 2–4)
GLUCOSE SERPL-MCNC: 344 MG/DL (ref 65–100)
HCT VFR BLD AUTO: 33 % (ref 35–47)
HGB BLD-MCNC: 10.1 G/DL (ref 11.5–16)
IMM GRANULOCYTES # BLD AUTO: 0 K/UL
IMM GRANULOCYTES NFR BLD AUTO: 0 %
LYMPHOCYTES # BLD: 1.2 K/UL (ref 0.8–3.5)
LYMPHOCYTES NFR BLD: 6 % (ref 12–49)
MCH RBC QN AUTO: 26.2 PG (ref 26–34)
MCHC RBC AUTO-ENTMCNC: 30.6 G/DL (ref 30–36.5)
MCV RBC AUTO: 85.5 FL (ref 80–99)
METAMYELOCYTES NFR BLD MANUAL: 1 %
MONOCYTES # BLD: 1.2 K/UL (ref 0–1)
MONOCYTES NFR BLD: 6 % (ref 5–13)
NEUTS BAND NFR BLD MANUAL: 1 % (ref 0–6)
NEUTS SEG # BLD: 17 K/UL (ref 1.8–8)
NEUTS SEG NFR BLD: 86 % (ref 32–75)
NRBC # BLD: 0 K/UL (ref 0–0.01)
NRBC BLD-RTO: 0 PER 100 WBC
PLATELET # BLD AUTO: 460 K/UL (ref 150–400)
PMV BLD AUTO: 9.7 FL (ref 8.9–12.9)
POTASSIUM SERPL-SCNC: 3.7 MMOL/L (ref 3.5–5.1)
PROT SERPL-MCNC: 6.3 G/DL (ref 6.4–8.2)
RBC # BLD AUTO: 3.86 M/UL (ref 3.8–5.2)
RBC MORPH BLD: ABNORMAL
SERVICE CMNT-IMP: ABNORMAL
SODIUM SERPL-SCNC: 136 MMOL/L (ref 136–145)
WBC # BLD AUTO: 19.5 K/UL (ref 3.6–11)
WBC MORPH BLD: ABNORMAL

## 2020-06-22 PROCEDURE — 65660000000 HC RM CCU STEPDOWN

## 2020-06-22 PROCEDURE — 97535 SELF CARE MNGMENT TRAINING: CPT

## 2020-06-22 PROCEDURE — 82550 ASSAY OF CK (CPK): CPT

## 2020-06-22 PROCEDURE — 74011000258 HC RX REV CODE- 258: Performed by: INTERNAL MEDICINE

## 2020-06-22 PROCEDURE — 97530 THERAPEUTIC ACTIVITIES: CPT

## 2020-06-22 PROCEDURE — 85025 COMPLETE CBC W/AUTO DIFF WBC: CPT

## 2020-06-22 PROCEDURE — 74011250637 HC RX REV CODE- 250/637: Performed by: STUDENT IN AN ORGANIZED HEALTH CARE EDUCATION/TRAINING PROGRAM

## 2020-06-22 PROCEDURE — 74011250637 HC RX REV CODE- 250/637: Performed by: INTERNAL MEDICINE

## 2020-06-22 PROCEDURE — 74011250637 HC RX REV CODE- 250/637: Performed by: FAMILY MEDICINE

## 2020-06-22 PROCEDURE — 36415 COLL VENOUS BLD VENIPUNCTURE: CPT

## 2020-06-22 PROCEDURE — 74011250636 HC RX REV CODE- 250/636: Performed by: INTERNAL MEDICINE

## 2020-06-22 PROCEDURE — 80053 COMPREHEN METABOLIC PANEL: CPT

## 2020-06-22 PROCEDURE — 97116 GAIT TRAINING THERAPY: CPT

## 2020-06-22 RX ADMIN — Medication 10 ML: at 15:41

## 2020-06-22 RX ADMIN — OXYCODONE AND ACETAMINOPHEN 1 TABLET: 10; 325 TABLET ORAL at 06:25

## 2020-06-22 RX ADMIN — OXYCODONE AND ACETAMINOPHEN 1 TABLET: 10; 325 TABLET ORAL at 21:32

## 2020-06-22 RX ADMIN — METOPROLOL TARTRATE 50 MG: 50 TABLET, FILM COATED ORAL at 18:34

## 2020-06-22 RX ADMIN — METOPROLOL TARTRATE 50 MG: 50 TABLET, FILM COATED ORAL at 12:37

## 2020-06-22 RX ADMIN — PRAVASTATIN SODIUM 20 MG: 20 TABLET ORAL at 21:31

## 2020-06-22 RX ADMIN — APIXABAN 5 MG: 5 TABLET, FILM COATED ORAL at 18:34

## 2020-06-22 RX ADMIN — GABAPENTIN 300 MG: 300 CAPSULE ORAL at 09:33

## 2020-06-22 RX ADMIN — PANTOPRAZOLE SODIUM 40 MG: 40 TABLET, DELAYED RELEASE ORAL at 06:25

## 2020-06-22 RX ADMIN — Medication 10 ML: at 21:32

## 2020-06-22 RX ADMIN — GABAPENTIN 300 MG: 300 CAPSULE ORAL at 15:41

## 2020-06-22 RX ADMIN — HYDROCODONE BITARTRATE AND ACETAMINOPHEN 1 TABLET: 10; 325 TABLET ORAL at 09:35

## 2020-06-22 RX ADMIN — METOPROLOL TARTRATE 50 MG: 50 TABLET, FILM COATED ORAL at 06:25

## 2020-06-22 RX ADMIN — GABAPENTIN 300 MG: 300 CAPSULE ORAL at 21:31

## 2020-06-22 RX ADMIN — OXYCODONE AND ACETAMINOPHEN 1 TABLET: 10; 325 TABLET ORAL at 12:36

## 2020-06-22 RX ADMIN — CEFTRIAXONE 2 G: 2 INJECTION, POWDER, FOR SOLUTION INTRAMUSCULAR; INTRAVENOUS at 15:40

## 2020-06-22 RX ADMIN — APIXABAN 5 MG: 5 TABLET, FILM COATED ORAL at 09:33

## 2020-06-22 RX ADMIN — Medication 10 ML: at 06:25

## 2020-06-22 RX ADMIN — OXYCODONE AND ACETAMINOPHEN 1 TABLET: 10; 325 TABLET ORAL at 00:55

## 2020-06-22 NOTE — PROGRESS NOTES
Problem: Mobility Impaired (Adult and Pediatric)  Goal: *Acute Goals and Plan of Care (Insert Text)  Description: FUNCTIONAL STATUS PRIOR TO ADMISSION: Patient was modified independent using a rollator for functional mobility. HOME SUPPORT PRIOR TO ADMISSION: The patient lived alone with neighbors to provide assistance. Physical Therapy Goals  Initiated 6/17/2020  1. Patient will move from supine to sit and sit to supine  in bed with independence within 7 day(s). 2.  Patient will transfer from bed to chair and chair to bed with independence using the least restrictive device within 7 day(s). 3.  Patient will perform sit to stand with modified independence within 7 day(s). 4.  Patient will ambulate with modified independence for 100 feet with the least restrictive device within 7 day(s). 5.  Patient will ascend/descend 3 stairs with 2 handrail(s) with modified independence within 7 day(s). Note:   PHYSICAL THERAPY TREATMENT  Patient: Jory Flowers (41 y.o. female)  Date: 6/22/2020  Diagnosis: Sepsis ([facility] [A41.9]   <principal problem not specified>       Precautions: Fall, Contact  Chart, physical therapy assessment, plan of care and goals were reviewed. ASSESSMENT  Pt seen this afternoon. Patient continues with skilled PT services. Pt CGA sit to stand. Pt ambulated 180ft with RW CGA. Pts balance good on level surface. Pt progress well. Continue goals. Current Level of Function Impacting Discharge (mobility/balance):Pt is CGA for mobility. PLAN :  Patient continues to benefit from skilled intervention to address the above impairments. Continue treatment per established plan of care. to address goals.     Recommendation for discharge: (in order for the patient to meet his/her long term goals)  Rehab vs SNF    This discharge recommendation:  Has been made in collaboration with the attending provider and/or case management    IF patient discharges home will need the following DME: rolling walker       SUBJECTIVE:       OBJECTIVE DATA SUMMARY:   Critical Behavior:  Neurologic State: Alert  Orientation Level: Oriented X4  Cognition: Follows commands  Safety/Judgement: Awareness of environment  Functional Mobility Training:  Bed Mobility:                    Transfers:  Sit to Stand: Contact guard assistance  Stand to Sit: Contact guard assistance                             Balance:  Sitting: Intact  Standing: Intact; With support  Standing - Static: Good  Standing - Dynamic : Good  Ambulation/Gait Training:  Distance (ft): 180 Feet (ft)  Assistive Device: Gait belt;Walker, rolling  Ambulation - Level of Assistance: Contact guard assistance        Gait Abnormalities: Decreased step clearance              Speed/Marlyn: Pace decreased (<100 feet/min)  Step Length: Right shortened;Left shortened                Activity Tolerance:   Good  Please refer to the flowsheet for vital signs taken during this treatment.     After treatment patient left in no apparent distress:   Sitting in chair    COMMUNICATION/COLLABORATION:   The patients plan of care was discussed with: Physical therapist.     Joan Vargas PTA   Time Calculation: 23 mins

## 2020-06-22 NOTE — PROGRESS NOTES
Bedside shift change report given to NEHA Mohan (oncoming nurse) by Kevin Lozada RN (offgoing nurse). Report included the following information SBAR, Kardex, Accordion and Recent Results.

## 2020-06-22 NOTE — PROGRESS NOTES
Problem: Self Care Deficits Care Plan (Adult)  Goal: *Acute Goals and Plan of Care (Insert Text)  Description:   FUNCTIONAL STATUS PRIOR TO ADMISSION: Patient was modified independent using a rollator for functional mobility. HOME SUPPORT: The patient lived alone with good neighbors, friends and family local.    Occupational Therapy Goals  Initiated 6/17/2020  1. Patient will perform grooming standing at sink with supervision/set-up within 7 day(s). 2.  Patient will perform lower body dressing using adaptive equipment with supervision/set-up within 7 day(s). 3.  Patient will perform toilet transfers with supervision/set-up using rollator within 7 day(s). 4.  Patient will perform all aspects of toileting with supervision/set-up within 7 day(s). 5.  Patient will participate in upper extremity therapeutic exercise/activities with modified independence for 10 minutes within 7 day(s). 6.  Patient will utilize energy conservation techniques during functional activities with verbal and visual cues within 7 day(s). Outcome: Progressing Towards Goal   OCCUPATIONAL THERAPY TREATMENT  Patient: Kelley Larsen (15 y.o. female)  Date: 6/22/2020  Diagnosis: Sepsis ([facility] [A41.9]   <principal problem not specified>       Precautions: Fall, Contact  Chart, occupational therapy assessment, plan of care, and goals were reviewed. ASSESSMENT  Patient continues with skilled OT services and is progressing towards goals. Patient received seated in chair, after walking with PTA. Patient agreeable to activity and requesting toileting. Patient to commode in bathroom with CGA. She is able to manage clothing with CGA and hygiene with supervision from seated position. Patient able to stand at sink for light grooming tasks. Patient with good tolerance for all activity today. Patient return to chair at end of session.      Current Level of Function Impacting Discharge (ADLs): CGA to supervision    Other factors to consider for discharge: lives alone         PLAN :  Patient continues to benefit from skilled intervention to address the above impairments. Continue treatment per established plan of care. to address goals. Recommend with staff: carlos Fernández     Recommend next OT session: progression of goals    Recommendation for discharge: (in order for the patient to meet his/her long term goals)  Therapy up to 5 days/week in SNF setting or an intensive home health therapy program    This discharge recommendation:  Has been made in collaboration with the attending provider and/or case management    IF patient discharges home will need the following DME: TBD       SUBJECTIVE:   Patient stated Im feeling good.     OBJECTIVE DATA SUMMARY:   Cognitive/Behavioral Status:  Neurologic State: Alert  Orientation Level: Oriented X4  Cognition: Follows commands  Perception: Appears intact  Perseveration: No perseveration noted  Safety/Judgement: Awareness of environment    Functional Mobility and Transfers for ADLs:  Bed Mobility:       Transfers:  Sit to Stand: Supervision  Functional Transfers  Bathroom Mobility: Contact guard assistance  Toilet Transfer : Contact guard assistance  Cues: Physical assistance;Verbal cues provided       Balance:  Sitting: Intact  Standing: Intact; With support  Standing - Static: Good  Standing - Dynamic : Good    ADL Intervention:       Grooming  Grooming Assistance: Contact guard assistance(standing at sink)  Washing Hands: Contact guard assistance  Cues: Physical assistance;Verbal cues provided                        Toileting  Bladder Hygiene: Supervision  Clothing Management: Contact guard assistance  Cues: Verbal cues provided  Adaptive Equipment: Grab bars; Walker    Cognitive Retraining  Safety/Judgement: Awareness of environment    Therapeutic Exercises:       Pain:      Activity Tolerance:   Good  Please refer to the flowsheet for vital signs taken during this treatment.     After treatment patient left in no apparent distress:   Sitting in chair and Call bell within reach    COMMUNICATION/COLLABORATION:   The patients plan of care was discussed with: Physical therapist and Registered nurse.      Neris Lugo OTR/L  Time Calculation: 25 mins

## 2020-06-22 NOTE — PROGRESS NOTES
Daily Progress Note: 6/22/2020  Cristin Dong MD    Assessment/Plan:    SIRS (systemic inflammatory response syndrome)/Sepsis due to UTI (E Coli) with 4/4 SIRS  - treat as below     Hypothermia: likely environmental as patient was found in her yard, leukocytosis, tachycardia, tachypnea. Likely from UTI. Not severe sepsis. Lactate WNR. Also noting diarrhea. C. Diff not obtained due to stools becoming more firm. IV CTX and IV Flagyl initially - then to CTX. UTI  - gram neg compa in urine (E Coli) and blood  - initial coverage with Rocephin and Flagyl - to Rocephin 6/18       Fall: found down in her yard by neighbor, soiled in her own urine and feces due to inability to get up. -CM consult  -pt declining to consider rehab    Atrial flutter with RVR:   - Continue metoprolol and Eliquis. Cardiology consulted and adjusting BB. TTE ok      Cirrhosis:  Denies ETOH use. GI consulted      Renal insufficiency / Rhabdomyolysis: continue IVF. Follow BMP. CK improved      Rib fracture: pain control, incentive spirometer      Lung nodules: will need outpatient follow up      Hypothyroidism: TSH low.  Synthroid held for now.       HTN (hypertension):  Cont metoprolol otherwise hold other antihypertensives or meds as per Cardiology orders    Chronic Low back pain  - tx as needed      Code Status: FULL     Problem List:  Problem List as of 6/22/2020 Date Reviewed: 6/16/2020          Codes Class Noted - Resolved    Cirrhosis ([facility] ICD-10-CM: K74.60  ICD-9-CM: 571.5  6/16/2020 - Present        Transaminitis ICD-10-CM: R74.0  ICD-9-CM: 790.4  6/16/2020 - Present        Lung nodules ICD-10-CM: R91.8  ICD-9-CM: 793.19  6/16/2020 - Present        Atrial flutter ([facility] ICD-10-CM: F04.73  ICD-9-CM: 427.32  6/16/2020 - Present        HTN (hypertension) ICD-10-CM: I10  ICD-9-CM: 401.9  6/16/2020 - Present        Renal insufficiency ICD-10-CM: N28.9  ICD-9-CM: 593.9  6/16/2020 - Present        Rhabdomyolysis ICD-10-CM: W69.21  ICD-9-CM: 728.88  6/16/2020 - Present        SIRS (systemic inflammatory response syndrome) ([facility] ICD-10-CM: R65.10  ICD-9-CM: 995.90  6/16/2020 - Present        Hypothermia ICD-10-CM: T68. Velvet Sour  ICD-9-CM: 991.6  6/16/2020 - Present        Rib fracture ICD-10-CM: S22.39XA  ICD-9-CM: 807.00  6/16/2020 - Present        Sepsis ([facility] ICD-10-CM: A41.9  ICD-9-CM: 038.9, 995.91  6/16/2020 - Present        Hypothyroidism ICD-10-CM: E03.9  ICD-9-CM: 244.9  6/16/2020 - Present            Subjective:    75 y.o. female w/ hx of AF, HTN who presents with fall. Patient was found down in her yard by her neighbor, after lying there for 2 days. She could not provide details of the fall, but does report that she was too weak to get up to get help. She does report recent diarrhea, urinary incontinence however otherwise denies fevers of chills, abdominal pain, n/v, CP, SOB, cough, HA. She thought she had dizziness. ED workup showed SIRS criteria, UTI, elevated liver enzymes, and elevated CK. Ms. Esperanza Tong is admitted for further evaluation and management. (Dr hSaw Hayes)    6/17:  Still having mult loose stools and episodic crampy ab pain. C diff pending when able to collect - nurses report majority of her stools are semi-soft to loose. Her major complaint is low back pain which is chronic. BPs better today so can restart her usual pain meds. Looks like she is in and out of A flutter - Cards to see. CK 1300. WBC better at 16K.      6/18:  Fewer stools and stools are soft. Urine and blood with gram neg rods - continuing Rocephin and Flagyl for now. WBC better at 12K. Afeb. CK down to 421. Cards has seen. 6/19:  C/O \"pain all over\" and cannot be more specific. She wants more narcotics - advised to try to hold at current levels of pain meds but since Creat is ok will give a couple doses of Toradol prn. E Coli in blood and urine - sens to Rocephin - will do another day of IV and then to po med tomorrow.   Cards titrating Metoprolol to control rate and recommends DC of Synthroid for now and recheck TSH in 6 wks to determine need to restart it. Since she is moving so slowly, recommended rehab but she is opposed. :   Still c/o pain \"all over\" but now mostly in back. Advised Ortho consult but she declines. Only wants more pain meds. Still very slow. Will cont Rocephin IV for now. Sed rate 50. Heart a little slower in the 100's. Will try a short course of steroids. :  Pain in right chest from rib fx worse with movement she reports to the point \"can't stand it if I move or twist.\"   Wants more pain meds - will change to Percocet. Now agrees to go to rehab. WBC up from the steroids. Does report back is better after the steroids. Still declines to have Ortho see her.      :  Feeling much better this AM.  Less pain with movement. Still agreeable to go to rehab. WBC and glucose up due to steroids. Review of Systems:   A comprehensive review of systems was negative except for that written in the HPI. Objective:   Physical Exam:   Visit Vitals  /83 (BP 1 Location: Right arm, BP Patient Position: At rest)   Pulse 81   Temp 98.1 °F (36.7 °C)   Resp 18   Ht 5' 8\" (1.727 m)   Wt 110.3 kg (243 lb 3.2 oz)   SpO2 93%   BMI 36.98 kg/m²      O2 Device: Room air  Temp (24hrs), Av °F (36.7 °C), Min:97.6 °F (36.4 °C), Max:98.5 °F (36.9 °C)    No intake/output data recorded.  07 -  1900  In: 5458 [P.O.:490; I.V.:1200]  Out: 650 [Urine:650]  General:  Alert, cooperative, elderly, obese, no distress, appears stated age. Head:  Normocephalic, without obvious abnormality, atraumatic. Eyes:  Conjunctivae/corneas clear. EOMs intact. Throat: Lips, mucosa, and tongue moist..   Neck: Supple, symmetrical, trachea midline, no adenopathy, thyroid: no enlargement/tenderness/nodules, no carotid bruit and no JVD. Lungs:   Clear to auscultation bilaterally.    Chest wall:  No tenderness or deformity except tender over area of right rib fx. Heart:  Irregular, rapid at times. no murmur, click, rub or gallop. Abdomen:   Soft, with mild generalized tenderness. No R/G. Bowel sounds active. No masses,  No organomegaly. Extremities: no cyanosis or edema. No calf tenderness or cords. Pulses: 2+ and symmetric all extremities. Skin: Skin color, texture, turgor normal. No rashes. Minor abrasions on knees, elbows are healing well. Neurologic:   Alert and oriented X 3.  equal.  No cogwheeling or rigidity. Gait not tested at this time. Sensation grossly normal to touch. Gross motor of extremities normal.       Data Review:     LIMITED ULTRASOUND OF THE ABDOMEN   6/17/20  INDICATION: Cirrhosis with common bile duct dilation  COMPARISON: CT chest abdomen pelvis 6/16/2020. TECHNIQUE:  Limited sonography of the abdomen was performed. FINDINGS:  LIVER: Mild intrahepatic biliary ductal dilation. No overt cirrhosis. The liver  is incompletely imaged. No mass is shown in the imaged portions. MAIN PORTAL VEIN: Patent. Appropriate hepatopetal flow. GALLBLADDER: Surgically absent. COMMON DUCT: 10 mm in diameter. Dilated. PANCREAS: A small portion of the visualized proximal pancreas is normal; the  remainder is obscured. SPLEEN: Not shown. RIGHT KIDNEY: 10.8 cm in length. No hydronephrosis, shadowing calculus, or  contour-deforming renal mass. LEFT KIDNEY: Not shown. AORTA: Normal caliber in its visualized portions. INFERIOR VENA CAVA: Normal caliber in its visualized portions. IMPRESSION:   Biliary ductal dilation. CT Chest/Ab/Pelvis 6/16/20  INDICATION: Tachypnea, cough, ground-level fall 2 days ago, hypothermia. Back  and left hip pain  COMPARISON: None  CONTRAST: None.   FINDINGS:  CHEST WALL: No mass or axillary lymphadenopathy. THYROID: No nodule. MEDIASTINUM: No mass or lymphadenopathy. GELA: No mass or lymphadenopathy. THORACIC AORTA: No aneurysm.   MAIN PULMONARY ARTERY: Normal in caliber. TRACHEA/BRONCHI: Patent. ESOPHAGUS: No wall thickening or dilatation. HEART: Normal in size. PLEURA: No effusion or pneumothorax. LUNGS: 5 mm right upper lobe nodule (series 2, image 15). 2 mm left upper lobe  nodule (image 13). 2 mm right upper lobe nodule (series 2, image 29). The absence of intravenous contrast material reduces the sensitivity for  evaluation of the solid parenchymal organs of the abdomen. No focal  abnormalities are seen within the liver, spleen, pancreas or adrenal glands or  kidneys. No renal or ureteral calculus or obstruction is identified. Cholecystectomy clips are present. There is a left renal 5.5 cm cyst with  Hounsfield unit measurement of 6.4 (axial image 71). The liver is macrolobulated  with mild central biliary prominence. The aorta is atherosclerotic but tapers without aneurysm. There is no  retroperitoneal adenopathy or mass. The bowel is not obstructed. There are multiple sigmoid diverticula. The  appendix is normal on axial image 101. There is no ascites or free  intraperitoneal air. The uterus is small. There is no pelvic mass or adenopathy. The right total hip replacement in place creates a moderate amount of streak  artifact. There is disc space narrowing at multiple lumbar levels. There is an acute left lateral 11th rib fracture (sagittal image 115)  IMPRESSION:   Pulmonary micronodules  Incidental sigmoid diverticulosis. Cirrhosis with mild intrahepatic biliary dilatation. EXAM:  CT CERVICAL SPINE WITHOUT CONTRAST 6/16/20  INDICATION: Limited ROM, pain, GLF.  COMPARISON: None. CONTRAST:  None.   FINDINGS:  There is no acute fracture or dislocation. C5-6 and C6-7 show some prominent  change of disc degeneration and spondylosis of.  Prominent facet hypertrophy seen on the right at C3-C4. On the left at C4-C5. Bilateral foraminal narrowing seen at C5-6 and C6-7. Mild canal stenosis seen at  those 2 level by spur.   IMPRESSION: Prominent spondylosis, no acute findings seen. ECHO 6/18/20  Interpretation Summary   Result status: Final result   · Image quality for this study was suboptimal.  · Normal cavity size and systolic function (ejection fraction normal). Mildly increased wall thickness. Calculated left ventricular ejection fraction is 55%. · Not well visualized. · Probably trileaflet aortic valve. Aortic valve sclerosis. · Appears normal. Mild mitral annular calcification. Did not appreciate any MR in the images provided. · Mild tricuspid valve regurgitation is present. Recent Days:  Recent Labs     06/22/20  0100 06/21/20  0525 06/20/20  0111   WBC 19.5* 15.3* 10.9   HGB 10.1* 10.8* 9.9*   HCT 33.0* 34.9* 32.3*   * 439* 391     Recent Labs     06/22/20 0100 06/21/20  0525 06/20/20  0111    138 143   K 3.7 3.9 3.7    103 109*   CO2 29 27 29   * 359* 183*   BUN 18 14 14   CREA 1.03* 1.17* 0.92   CA 8.7 9.1 8.6   ALB 3.0* 3.0* 2.5*   TBILI 0.4 0.4 0.3   ALT 55 66 61       24 Hour Results:  Recent Results (from the past 24 hour(s))   CK    Collection Time: 06/22/20  1:00 AM   Result Value Ref Range    CK 56 26 - 192 U/L   CBC WITH AUTOMATED DIFF    Collection Time: 06/22/20  1:00 AM   Result Value Ref Range    WBC 19.5 (H) 3.6 - 11.0 K/uL    RBC 3.86 3.80 - 5.20 M/uL    HGB 10.1 (L) 11.5 - 16.0 g/dL    HCT 33.0 (L) 35.0 - 47.0 %    MCV 85.5 80.0 - 99.0 FL    MCH 26.2 26.0 - 34.0 PG    MCHC 30.6 30.0 - 36.5 g/dL    RDW 16.7 (H) 11.5 - 14.5 %    PLATELET 441 (H) 202 - 400 K/uL    MPV 9.7 8.9 - 12.9 FL    NRBC 0.0 0  WBC    ABSOLUTE NRBC 0.00 0.00 - 0.01 K/uL    NEUTROPHILS 86 (H) 32 - 75 %    BAND NEUTROPHILS 1 0 - 6 %    LYMPHOCYTES 6 (L) 12 - 49 %    MONOCYTES 6 5 - 13 %    EOSINOPHILS 0 0 - 7 %    BASOPHILS 0 0 - 1 %    METAMYELOCYTES 1 (H) 0 %    IMMATURE GRANULOCYTES 0 %    ABS. NEUTROPHILS 17.0 (H) 1.8 - 8.0 K/UL    ABS. LYMPHOCYTES 1.2 0.8 - 3.5 K/UL    ABS.  MONOCYTES 1.2 (H) 0.0 - 1.0 K/UL    ABS. EOSINOPHILS 0.0 0.0 - 0.4 K/UL    ABS. BASOPHILS 0.0 0.0 - 0.1 K/UL    ABS. IMM. GRANS. 0.0 K/UL    DF MANUAL      RBC COMMENTS ANISOCYTOSIS  1+        WBC COMMENTS TOXIC GRANULATION     METABOLIC PANEL, COMPREHENSIVE    Collection Time: 06/22/20  1:00 AM   Result Value Ref Range    Sodium 136 136 - 145 mmol/L    Potassium 3.7 3.5 - 5.1 mmol/L    Chloride 100 97 - 108 mmol/L    CO2 29 21 - 32 mmol/L    Anion gap 7 5 - 15 mmol/L    Glucose 344 (H) 65 - 100 mg/dL    BUN 18 6 - 20 MG/DL    Creatinine 1.03 (H) 0.55 - 1.02 MG/DL    BUN/Creatinine ratio 17 12 - 20      GFR est AA >60 >60 ml/min/1.73m2    GFR est non-AA 52 (L) >60 ml/min/1.73m2    Calcium 8.7 8.5 - 10.1 MG/DL    Bilirubin, total 0.4 0.2 - 1.0 MG/DL    ALT (SGPT) 55 12 - 78 U/L    AST (SGOT) 16 15 - 37 U/L    Alk.  phosphatase 81 45 - 117 U/L    Protein, total 6.3 (L) 6.4 - 8.2 g/dL    Albumin 3.0 (L) 3.5 - 5.0 g/dL    Globulin 3.3 2.0 - 4.0 g/dL    A-G Ratio 0.9 (L) 1.1 - 2.2         Medications reviewed  Current Facility-Administered Medications   Medication Dose Route Frequency    oxyCODONE-acetaminophen (PERCOCET 10)  mg per tablet 1 Tab  1 Tab Oral Q4H PRN    metoprolol tartrate (LOPRESSOR) tablet 50 mg  50 mg Oral Q6H    gabapentin (NEURONTIN) capsule 300 mg  300 mg Oral TID    HYDROcodone-acetaminophen (NORCO)  mg tablet 1 Tab  1 Tab Oral Q6H PRN    sodium chloride (NS) flush 5-40 mL  5-40 mL IntraVENous Q8H    sodium chloride (NS) flush 5-40 mL  5-40 mL IntraVENous PRN    acetaminophen (TYLENOL) tablet 650 mg  650 mg Oral Q6H PRN    naloxone (NARCAN) injection 0.4 mg  0.4 mg IntraVENous PRN    cefTRIAXone (ROCEPHIN) 2 g in 0.9% sodium chloride (MBP/ADV) 50 mL  2 g IntraVENous Q24H    apixaban (ELIQUIS) tablet 5 mg  5 mg Oral BID    pantoprazole (PROTONIX) tablet 40 mg  40 mg Oral ACB    pravastatin (PRAVACHOL) tablet 20 mg  20 mg Oral QHS     Care Plan discussed with: Patient and Nurse  Total time spent with patient: 30 minutes.   Fanny Gonzalez MD

## 2020-06-22 NOTE — PROGRESS NOTES
Bedside shift change report given to Kevin Lozada (oncoming nurse) by Timo Sheffield (offgoing nurse). Report included the following information SBAR, Kardex, MAR and Recent Results.

## 2020-06-22 NOTE — PROGRESS NOTES
Spiritual Care Assessment/Progress Note  [address]      NAME: Anali Estes      MRN: [id number]  AGE: 75 y.o.  SEX: female  Yazidism Affiliation: No preference   Language: English     6/22/2020     Total Time (in minutes): 21     Spiritual Assessment begun in [facility] 5M1 MED SURG 1 through conversation with:         [x]Patient        [] Family    [] Friend(s)        Reason for Consult: Initial/Spiritual assessment, patient floor     Spiritual beliefs: (Please include comment if needed)     [x] Identifies with a eusebia tradition:         [x] Supported by a eusebia community:            [] Claims no spiritual orientation:           [] Seeking spiritual identity:                [] Adheres to an individual form of spirituality:           [] Not able to assess:                           Identified resources for coping:      [x] Prayer                               [] Music                  [] Guided Imagery     [x] Family/friends                 [] Pet visits     [] Devotional reading                         [] Unknown     [] Other:                                              Interventions offered during this visit: (See comments for more details)    Patient Interventions: Affirmation of emotions/emotional suffering, Affirmation of eusebia, Coping skills reviewed/reinforced, Normalization of emotional/spiritual concerns, Initial/Spiritual assessment, patient floor, Yazidism beliefs/image of God discussed, Iconic (affirming the presence of God/Higher Power)           Plan of Care:     [] Support spiritual and/or cultural needs    [] Support AMD and/or advance care planning process      [] Support grieving process   [] Coordinate Rites and/or Rituals    [] Coordination with community clergy   [] No spiritual needs identified at this time   [] Detailed Plan of Care below (See Comments)  [] Make referral to Music Therapy  [] Make referral to Pet Therapy     [] Make referral to Addiction services  [] Make referral to [facility]  [] Make referral to Spiritual Care Partner  [] No future visits requested        [x] Follow up visits as needed     Comments:  visited pt, Miss Mitul Morales, at the Med Surg unit for initial spiritual assessment. Pt was sitting in her recliner at the time of visit. Two staff members were working with pt. She however indicated that she would still like  to stay for the visit. Pt initiated conversation about incident that brought her to the hospital. She attribute God protection and providence to her surviving the ordeal. Pt indicating she was still grieving the loss of her son last January. Her eusebia has been her main source of coping. Pt shared some spiritual experiences which anchores her eusebia, stating tearfully that even in her suffering, she strives to focus on [facility], whose sufferings far supercedes hers.  provided supportive presence and affirmation of thoughts and feelings. Pt indicated that visit was very helpful, stating that chaplains presence was very helpful. Chaplains offer to pray with pt at the end of visit was gladly accepted, and pt expressed gratitude for visit and prayer. Spiritual care is still available as needed or as able. Visited by: Mario Campbell.   To teddy joel: [phone]

## 2020-06-22 NOTE — PROGRESS NOTES
Bedside and Verbal shift change report given to [employer], RN (oncoming nurse) by Myriam Varela RN (offgoing nurse). Report included the following information SBAR, Kardex and ED Summary.

## 2020-06-23 VITALS
WEIGHT: 233.25 LBS | HEIGHT: 68 IN | SYSTOLIC BLOOD PRESSURE: 161 MMHG | HEART RATE: 73 BPM | TEMPERATURE: 98 F | BODY MASS INDEX: 35.35 KG/M2 | OXYGEN SATURATION: 97 % | RESPIRATION RATE: 18 BRPM | DIASTOLIC BLOOD PRESSURE: 94 MMHG

## 2020-06-23 LAB
ALBUMIN SERPL-MCNC: 2.9 G/DL (ref 3.5–5)
ALBUMIN/GLOB SERPL: 0.9 {RATIO} (ref 1.1–2.2)
ALP SERPL-CCNC: 80 U/L (ref 45–117)
ALT SERPL-CCNC: 47 U/L (ref 12–78)
ANION GAP SERPL CALC-SCNC: 6 MMOL/L (ref 5–15)
AST SERPL-CCNC: 19 U/L (ref 15–37)
BASOPHILS # BLD: 0 K/UL (ref 0–0.1)
BASOPHILS NFR BLD: 0 % (ref 0–1)
BILIRUB SERPL-MCNC: 0.3 MG/DL (ref 0.2–1)
BUN SERPL-MCNC: 20 MG/DL (ref 6–20)
BUN/CREAT SERPL: 19 (ref 12–20)
CALCIUM SERPL-MCNC: 8.5 MG/DL (ref 8.5–10.1)
CHLORIDE SERPL-SCNC: 103 MMOL/L (ref 97–108)
CK SERPL-CCNC: 39 U/L (ref 26–192)
CO2 SERPL-SCNC: 29 MMOL/L (ref 21–32)
CREAT SERPL-MCNC: 1.07 MG/DL (ref 0.55–1.02)
DIFFERENTIAL METHOD BLD: ABNORMAL
EOSINOPHIL # BLD: 0 K/UL (ref 0–0.4)
EOSINOPHIL NFR BLD: 0 % (ref 0–7)
ERYTHROCYTE [DISTWIDTH] IN BLOOD BY AUTOMATED COUNT: 16.9 % (ref 11.5–14.5)
GLOBULIN SER CALC-MCNC: 3.1 G/DL (ref 2–4)
GLUCOSE SERPL-MCNC: 225 MG/DL (ref 65–100)
HCT VFR BLD AUTO: 35.1 % (ref 35–47)
HGB BLD-MCNC: 10.8 G/DL (ref 11.5–16)
IMM GRANULOCYTES # BLD AUTO: 0 K/UL (ref 0–0.04)
IMM GRANULOCYTES NFR BLD AUTO: 0 % (ref 0–0.5)
LYMPHOCYTES # BLD: 1.2 K/UL (ref 0.8–3.5)
LYMPHOCYTES NFR BLD: 9 % (ref 12–49)
MCH RBC QN AUTO: 26.2 PG (ref 26–34)
MCHC RBC AUTO-ENTMCNC: 30.8 G/DL (ref 30–36.5)
MCV RBC AUTO: 85.2 FL (ref 80–99)
METAMYELOCYTES NFR BLD MANUAL: 2 %
MONOCYTES # BLD: 0.9 K/UL (ref 0–1)
MONOCYTES NFR BLD: 7 % (ref 5–13)
MYELOCYTES NFR BLD MANUAL: 2 %
NEUTS BAND NFR BLD MANUAL: 1 %
NEUTS SEG # BLD: 10.2 K/UL (ref 1.8–8)
NEUTS SEG NFR BLD: 79 % (ref 32–75)
NRBC # BLD: 0 K/UL (ref 0–0.01)
NRBC BLD-RTO: 0 PER 100 WBC
PLATELET # BLD AUTO: 442 K/UL (ref 150–400)
PMV BLD AUTO: 9.7 FL (ref 8.9–12.9)
POTASSIUM SERPL-SCNC: 3.5 MMOL/L (ref 3.5–5.1)
PROT SERPL-MCNC: 6 G/DL (ref 6.4–8.2)
RBC # BLD AUTO: 4.12 M/UL (ref 3.8–5.2)
RBC MORPH BLD: ABNORMAL
SODIUM SERPL-SCNC: 138 MMOL/L (ref 136–145)
WBC # BLD AUTO: 12.8 K/UL (ref 3.6–11)
WBC MORPH BLD: ABNORMAL

## 2020-06-23 PROCEDURE — 97535 SELF CARE MNGMENT TRAINING: CPT

## 2020-06-23 PROCEDURE — 36415 COLL VENOUS BLD VENIPUNCTURE: CPT

## 2020-06-23 PROCEDURE — 74011250636 HC RX REV CODE- 250/636: Performed by: FAMILY MEDICINE

## 2020-06-23 PROCEDURE — 80053 COMPREHEN METABOLIC PANEL: CPT

## 2020-06-23 PROCEDURE — 74011250637 HC RX REV CODE- 250/637: Performed by: STUDENT IN AN ORGANIZED HEALTH CARE EDUCATION/TRAINING PROGRAM

## 2020-06-23 PROCEDURE — 82550 ASSAY OF CK (CPK): CPT

## 2020-06-23 PROCEDURE — 97116 GAIT TRAINING THERAPY: CPT

## 2020-06-23 PROCEDURE — 74011250637 HC RX REV CODE- 250/637: Performed by: INTERNAL MEDICINE

## 2020-06-23 PROCEDURE — 97530 THERAPEUTIC ACTIVITIES: CPT

## 2020-06-23 PROCEDURE — 85025 COMPLETE CBC W/AUTO DIFF WBC: CPT

## 2020-06-23 PROCEDURE — 74011250637 HC RX REV CODE- 250/637: Performed by: FAMILY MEDICINE

## 2020-06-23 PROCEDURE — 74011000258 HC RX REV CODE- 258: Performed by: FAMILY MEDICINE

## 2020-06-23 RX ORDER — CEFDINIR 300 MG/1
300 CAPSULE ORAL EVERY 12 HOURS
Qty: 10 CAP | Refills: 0 | Status: SHIPPED | OUTPATIENT
Start: 2020-06-24 | End: 2020-06-29

## 2020-06-23 RX ORDER — OXYCODONE AND ACETAMINOPHEN 10; 325 MG/1; MG/1
1 TABLET ORAL
Qty: 4 TAB | Refills: 0 | Status: SHIPPED | OUTPATIENT
Start: 2020-06-23 | End: 2020-06-25

## 2020-06-23 RX ORDER — CEFDINIR 300 MG/1
300 CAPSULE ORAL EVERY 12 HOURS
Status: DISCONTINUED | OUTPATIENT
Start: 2020-06-24 | End: 2020-06-23 | Stop reason: HOSPADM

## 2020-06-23 RX ADMIN — GABAPENTIN 300 MG: 300 CAPSULE ORAL at 09:51

## 2020-06-23 RX ADMIN — HYDROCODONE BITARTRATE AND ACETAMINOPHEN 1 TABLET: 10; 325 TABLET ORAL at 14:14

## 2020-06-23 RX ADMIN — Medication 10 ML: at 14:16

## 2020-06-23 RX ADMIN — METOPROLOL TARTRATE 50 MG: 50 TABLET, FILM COATED ORAL at 00:02

## 2020-06-23 RX ADMIN — METOPROLOL TARTRATE 50 MG: 50 TABLET, FILM COATED ORAL at 12:55

## 2020-06-23 RX ADMIN — Medication 10 ML: at 06:21

## 2020-06-23 RX ADMIN — OXYCODONE AND ACETAMINOPHEN 1 TABLET: 10; 325 TABLET ORAL at 16:58

## 2020-06-23 RX ADMIN — METOPROLOL TARTRATE 50 MG: 50 TABLET, FILM COATED ORAL at 06:20

## 2020-06-23 RX ADMIN — PANTOPRAZOLE SODIUM 40 MG: 40 TABLET, DELAYED RELEASE ORAL at 06:45

## 2020-06-23 RX ADMIN — OXYCODONE AND ACETAMINOPHEN 1 TABLET: 10; 325 TABLET ORAL at 09:54

## 2020-06-23 RX ADMIN — HYDROCODONE BITARTRATE AND ACETAMINOPHEN 1 TABLET: 10; 325 TABLET ORAL at 06:20

## 2020-06-23 RX ADMIN — APIXABAN 5 MG: 5 TABLET, FILM COATED ORAL at 09:51

## 2020-06-23 RX ADMIN — CEFTRIAXONE 2 G: 2 INJECTION, POWDER, FOR SOLUTION INTRAMUSCULAR; INTRAVENOUS at 14:14

## 2020-06-23 RX ADMIN — OXYCODONE AND ACETAMINOPHEN 1 TABLET: 10; 325 TABLET ORAL at 02:45

## 2020-06-23 NOTE — PROGRESS NOTES
1654:  [facility] unable to accept pt for home health. A referral was sent to [facility] who accepted pt. Pt d/c'd to home transported by her son. NEHA Oliveros CM Note:  Transition of Care Plan:  RUR-13%-LOW  1.clinical updates sent to [facility]  2. Pt stated she felt she could do well at home with home health  3. Contacted Dr. Noel Henry & obtained order  4. Signed choice letter for [facility]--referral sent  5. Family to transport her at d/c  LMarita Brizuela

## 2020-06-23 NOTE — PROGRESS NOTES
Problem: Mobility Impaired (Adult and Pediatric)  Goal: *Acute Goals and Plan of Care (Insert Text)  Description: FUNCTIONAL STATUS PRIOR TO ADMISSION: Patient was modified independent using a rollator for functional mobility. HOME SUPPORT PRIOR TO ADMISSION: The patient lived alone with neighbors to provide assistance. Physical Therapy Goals  Initiated 6/17/2020  1. Patient will move from supine to sit and sit to supine  in bed with independence within 7 day(s). 2.  Patient will transfer from bed to chair and chair to bed with independence using the least restrictive device within 7 day(s). 3.  Patient will perform sit to stand with modified independence within 7 day(s). 4.  Patient will ambulate with modified independence for 100 feet with the least restrictive device within 7 day(s). 5.  Patient will ascend/descend 3 stairs with 2 handrail(s) with modified independence within 7 day(s). Note:   PHYSICAL THERAPY TREATMENT  Patient: Tyesha Richards (18 y.o. female)  Date: 6/23/2020  Diagnosis: Sepsis ([facility] [A41.9]   <principal problem not specified>       Precautions: Fall, Contact  Chart, physical therapy assessment, plan of care and goals were reviewed. ASSESSMENT  Patient continues with skilled PT services. Pt CGA sit to stand. Pt ambulated 100ft with rollator CGA. Pt was assisted in restroom before back to bed with min assist.Pt progressing with mobility. Continue goals. Current Level of Function Impacting Discharge (mobility/balance)Pt is CGA for ambulation with RW. PLAN :  Patient continues to benefit from skilled intervention to address the above impairments. Continue treatment per established plan of care. to address goals. Recommendation for discharge: (in order for the patient to meet his/her long term goals)  Physical therapy at least 2 days/week in the home AND ensure assist and/or supervision for safety.     This discharge recommendation:  Has been made in collaboration with the attending provider and/or case management    IF patient discharges home will need the following DME: rolling walker       SUBJECTIVE:       OBJECTIVE DATA SUMMARY:   Critical Behavior:  Neurologic State: Alert  Orientation Level: Oriented X4  Cognition: Follows commands  Safety/Judgement: Awareness of environment  Functional Mobility Training:  Bed Mobility:     Supine to Sit: Contact guard assistance  Sit to Supine: Minimum assistance           Transfers:  Sit to Stand: Contact guard assistance  Stand to Sit: Contact guard assistance                             Balance:  Sitting: Intact  Standing: Intact; With support  Ambulation/Gait Training:  Distance (ft): 100 Feet (ft)  Assistive Device: Gait belt;Walker, rolling  Ambulation - Level of Assistance: Contact guard assistance        Gait Abnormalities: Decreased step clearance              Speed/Marlyn: Pace decreased (<100 feet/min)  Step Length: Right shortened;Left shortened                Activity Tolerance:   Good and Fair  Please refer to the flowsheet for vital signs taken during this treatment.     After treatment patient left in no apparent distress:   Supine in bed    COMMUNICATION/COLLABORATION:   The patients plan of care was discussed with: Physical therapist.     Ke Monteiro PTA   Time Calculation: 38 mins

## 2020-06-23 NOTE — DISCHARGE INSTRUCTIONS
Patient Discharge Instructions    Maude Herring / [id number] : 1945    Admitted 2020 Discharged: 2020 3:16 PM     ACUTE DIAGNOSES:  Sepsis ([place] [address] [A41.9]    CHRONIC MEDICAL DIAGNOSES:  Problem List as of 2020 Date Reviewed: 2020          Codes Class Noted - Resolved    Cirrhosis ([place] [address] ICD-10-CM: K74.60  ICD-9-CM: 571.5  2020 - Present        Transaminitis ICD-10-CM: R74.0  ICD-9-CM: 790.4  2020 - Present        Lung nodules ICD-10-CM: R91.8  ICD-9-CM: 793.19  2020 - Present        Atrial flutter ([place] [address] ICD-10-CM: I48.92  ICD-9-CM: 427.32  2020 - Present        HTN (hypertension) ICD-10-CM: I10  ICD-9-CM: 401.9  2020 - Present        Renal insufficiency ICD-10-CM: N28.9  ICD-9-CM: 593.9  2020 - Present        Rhabdomyolysis ICD-10-CM: M62.82  ICD-9-CM: 728.88  2020 - Present        SIRS (systemic inflammatory response syndrome) ([place] [address] ICD-10-CM: R65.10  ICD-9-CM: 995.90  2020 - Present        Hypothermia ICD-10-CM: T68. Cody Sarna  ICD-9-CM: 991.6  2020 - Present        Rib fracture ICD-10-CM: S22.39XA  ICD-9-CM: 807.00  2020 - Present        Sepsis ([place] [address] ICD-10-CM: A41.9  ICD-9-CM: 038.9, 995.91  2020 - Present        Hypothyroidism ICD-10-CM: E03.9  ICD-9-CM: 244.9  2020 - Present              DISCHARGE MEDICATIONS:         · It is important that you take the medication exactly as they are prescribed. · Keep your medication in the bottles provided by the pharmacist and keep a list of the medication names, dosages, and times to be taken in your wallet. · Do not take other medications without consulting your doctor.        DIET:  Cardiac Diet    ACTIVITY: Activity as tolerated    ADDITIONAL INFORMATION: If you experience any of the following symptoms then please call your primary care physician or return to the emergency room if you cannot get hold of your doctor: Fever, chills, nausea, vomiting, diarrhea, change in mentation, falling, bleeding, shortness of breath. FOLLOW UP CARE:  Dr. Kasey Roche MD  you are to call and set up an appointment to see them with in 1 week. Follow-up with specialists at directed by them      Information obtained by :  I understand that if any problems occur once I am at home I am to contact my physician. I understand and acknowledge receipt of the instructions indicated above.                                                                                                                                            Physician's or R.N.'s Signature                                                                  Date/Time                                                                                                                                              Patient or Representative Signature                                                          Date/Time

## 2020-06-23 NOTE — PROGRESS NOTES
Problem: Self Care Deficits Care Plan (Adult)  Goal: *Acute Goals and Plan of Care (Insert Text)  Description:   FUNCTIONAL STATUS PRIOR TO ADMISSION: Patient was modified independent using a rollator for functional mobility. HOME SUPPORT: The patient lived alone with good neighbors, friends and family local.    Occupational Therapy Goals  Initiated 6/17/2020  1. Patient will perform grooming standing at sink with supervision/set-up within 7 day(s). 2.  Patient will perform lower body dressing using adaptive equipment with supervision/set-up within 7 day(s). 3.  Patient will perform toilet transfers with supervision/set-up using rollator within 7 day(s). 4.  Patient will perform all aspects of toileting with supervision/set-up within 7 day(s). 5.  Patient will participate in upper extremity therapeutic exercise/activities with modified independence for 10 minutes within 7 day(s). 6.  Patient will utilize energy conservation techniques during functional activities with verbal and visual cues within 7 day(s). Outcome: Progressing Towards Goal   OCCUPATIONAL THERAPY TREATMENT  Patient: Tyesha Richards (44 y.o. female)  Date: 6/23/2020  Diagnosis: Sepsis ([facility] [address] [A41.9]   <principal problem not specified>       Precautions: Fall, Contact  Chart, occupational therapy assessment, plan of care, and goals were reviewed. ASSESSMENT  Patient continues with skilled OT services and is progressing towards goals. Patient received seated in chair, agreeable to activity and requesting toileting tasks. Patient able to manage transfer with CGA to commode and manages hygiene/clothing with supervision/stand by assist.  Patient with good safety using rollator. Patient returned to chair with CGA and left in NAD.     Current Level of Function Impacting Discharge (ADLs): CGA    Other factors to consider for discharge: lives alone, but reports will have assist         PLAN :  Patient continues to benefit from skilled intervention to address the above impairments. Continue treatment per established plan of care. to address goals. Recommend with staff: ADLs, transfers     Recommend next OT session: continue goals    Recommendation for discharge: (in order for the patient to meet his/her long term goals)  Occupational therapy at least 2 days/week in the home AND ensure assist and/or supervision for safety with ADLs and transfers      This discharge recommendation:  Has been made in collaboration with the attending provider and/or case management    IF patient discharges home will need the following DME: none       SUBJECTIVE:   Patient stated Do I get an A?.    OBJECTIVE DATA SUMMARY:   Cognitive/Behavioral Status:  Neurologic State: Alert  Orientation Level: Oriented X4  Cognition: Appropriate decision making; Follows commands  Perception: Appears intact  Perseveration: No perseveration noted  Safety/Judgement: Awareness of environment    Functional Mobility and Transfers for ADLs:  Bed Mobility:  Supine to Sit: Contact guard assistance  Sit to Supine: Minimum assistance    Transfers:  Sit to Stand: Contact guard assistance  Functional Transfers  Bathroom Mobility: Contact guard assistance  Toilet Transfer : Contact guard assistance  Cues: Physical assistance;Verbal cues provided  Adaptive Equipment: Walker (comment)       Balance:  Sitting: Intact  Standing: Intact; With support    ADL Intervention:                                Toileting  Toileting Assistance: Stand-by assistance  Bladder Hygiene: Supervision  Clothing Management: Supervision  Adaptive Equipment: Grab bars; Other (comment)(rollator)    Cognitive Retraining  Safety/Judgement: Awareness of environment    Therapeutic Exercises:       Pain:      Activity Tolerance:   Good  Please refer to the flowsheet for vital signs taken during this treatment.     After treatment patient left in no apparent distress:   Sitting in chair, Call bell within reach, and Bed / chair alarm activated    COMMUNICATION/COLLABORATION:   The patients plan of care was discussed with: Physical therapist and Registered nurse.      Neris Lugo, OTR/L  Time Calculation: 27 mins

## 2020-06-23 NOTE — PROGRESS NOTES
Bedside shift change report given to Early NEHA Osorio (oncoming nurse) by Myrtle Travis RN (offgoing nurse). Report included the following information SBAR, Kardex, MAR and Accordion.

## 2020-06-23 NOTE — DISCHARGE SUMMARY
Physician Discharge Summary     Patient ID:    Alek Mai  [id number]  75 y.o.  1945  Devin Sewell MD    Admit date: 6/16/2020  Discharge date and time: 6/23/2020  Admission Diagnoses: Sepsis ([address] [A41.9], E Coli UTI    Discharge Medications:   Current Discharge Medication List      START taking these medications    Details   cefdinir (OMNICEF) 300 mg capsule Take 1 Cap by mouth every twelve (12) hours for 5 days. Qty: 10 Cap, Refills: 0      oxyCODONE-acetaminophen (PERCOCET 10)  mg per tablet Take 1 Tab by mouth every eight (8) hours as needed (for severe pain) for up to 4 doses. Max Daily Amount: 3 Tabs. Qty: 4 Tab, Refills: 0    Associated Diagnoses: Closed fracture of one rib of right side, initial encounter         CONTINUE these medications which have NOT CHANGED    Details   gabapentin (NEURONTIN) 300 mg capsule Take 300 mg by mouth three (3) times daily. metoprolol succinate (TOPROL-XL) 50 mg XL tablet Take 75 mg by mouth daily. HYDROcodone-acetaminophen (NORCO)  mg tablet Take 1.5 Tabs by mouth every six (6) hours as needed for Pain.      pravastatin (PRAVACHOL) 20 mg tablet Take 20 mg by mouth nightly. furosemide (LASIX) 40 mg tablet Take 40 mg by mouth daily as needed. omeprazole (PRILOSEC) 20 mg capsule Take 20 mg by mouth daily. levothyroxine (SYNTHROID) 50 mcg tablet Take 50 mcg by mouth Daily (before breakfast). HOLD UNTIL SEEN BY PCP      losartan (COZAAR) 25 mg tablet Take 25 mg by mouth daily. amLODIPine (NORVASC) 10 mg tablet Take 10 mg by mouth daily. apixaban (Eliquis) 5 mg tablet Take 5 mg by mouth two (2) times a day. Follow up Care:    1. Devin Sewell MD with in 1 weeks  2. specialists as directed. Diet:  Cardiac Diet  Disposition:  Home.   Advanced Directive:  Discharge Exam:  [See today's progress note.]  CONSULTATIONS: None    Significant Diagnostic Studies:   Recent Labs     06/23/20  0318 06/22/20  0100   WBC 12.8* 19.5*   HGB 10.8* 10.1*   HCT 35.1 33.0*   * 460*     Recent Labs     06/23/20 0318 06/22/20  0100 06/21/20  0525    136 138   K 3.5 3.7 3.9    100 103   CO2 29 29 27   BUN 20 18 14   CREA 1.07* 1.03* 1.17*   * 344* 359*   CA 8.5 8.7 9.1     Recent Labs     06/23/20 0318 06/22/20  0100 06/21/20  0525   ALT 47 55 66   AP 80 81 96   TBILI 0.3 0.4 0.4   TP 6.0* 6.3* 6.8   ALB 2.9* 3.0* 3.0*   GLOB 3.1 3.3 3.8     Recent Labs     06/23/20 0318 06/22/20  0100 06/21/20  0525   CPK 39 56 92     No results found for: GLUCPOC  Lab Results   Component Value Date/Time    TSH 0.10 (L) 06/16/2020 02:06 PM    T4, Free 1.9 (H) 06/16/2020 02:06 PM       HOSPITAL COURSE & TREATMENT RENDERED:   1. See today's progress note:  Daily Progress Note and Discharge Note: 6/23/2020  Devin Curtis MD         Assessment/Plan:   Zulma Hollowya (systemic inflammatory response syndrome)/Sepsis due to UTI (E Coli) with 4/4 SIRS  - treat as below      Hypothermia: likely environmental as patient was found in her yard, leukocytosis, tachycardia, tachypnea. Likely from UTI. Not severe sepsis. Lactate WNR. Also noting diarrhea. C. Diff not obtained due to stools becoming more firm. IV CTX and IV Flagyl initially - then to CTX.     UTI  - gram neg compa in urine (E Coli) and blood  - initial coverage with Rocephin and Flagyl - to Rocephin 6/18 - to po Omnicef 6/23      Fall: found down in her yard by neighbor, soiled in her own urine and feces due to inability to get up. -CM consult  -pt declining to consider rehab     Atrial flutter with RVR:   - Continue metoprolol and Eliquis. Cardiology consulted and adjusting BB. TTE ok      Cirrhosis:  Denies ETOH use. GI consulted      Renal insufficiency / Rhabdomyolysis: continue IVF. Follow BMP. CK improved      Rib fracture: pain control, incentive spirometer      Lung nodules: will need outpatient follow up      Hypothyroidism: TSH low. Synthroid held for now.     HTN (hypertension):  Cont metoprolol otherwise hold other antihypertensives or meds as per Cardiology orders     Chronic Low back pain  - tx as needed      Code Status: FULL      Problem List:             Problem List as of 6/23/2020 Date Reviewed: 6/16/2020           Codes Class Noted - Resolved     Cirrhosis ([facility] ICD-10-CM: K74.60  ICD-9-CM: 008. 5   6/16/2020 - Present           Transaminitis ICD-10-CM: R74.0  ICD-9-CM: 790.4   6/16/2020 - Present           Lung nodules ICD-10-CM: R91.8  ICD-9-CM: 793.19   6/16/2020 - Present           Atrial flutter ([facility]) ICD-10-CM: O89.47  ICD-9-CM: 427.32   6/16/2020 - Present           HTN (hypertension) ICD-10-CM: I10  ICD-9-CM: 682. 9   6/16/2020 - Present           Renal insufficiency ICD-10-CM: N28.9  ICD-9-CM: 593.9   6/16/2020 - Present           Rhabdomyolysis ICD-10-CM: M62.82  ICD-9-CM: 728.88   6/16/2020 - Present           SIRS (systemic inflammatory response syndrome) ([facility]) ICD-10-CM: R65.10  ICD-9-CM: 995.90   6/16/2020 - Present           Hypothermia ICD-10-CM: T68. XXXA  ICD-9-CM: 417. 6   6/16/2020 - Present           Rib fracture ICD-10-CM: S22.39XA  ICD-9-CM: 807.00   6/16/2020 - Present           Sepsis ([facility] ICD-10-CM: A41.9  ICD-9-CM: 038.9, 995.91   6/16/2020 - Present           Hypothyroidism ICD-10-CM: E03.9  ICD-9-CM: 244. 9   6/16/2020 - Present      Subjective:    75 y. o. female w/ hx of AF, HTN who presents with fall. Patient was found down in her yard by her neighbor, after lying there for 2 days. She could not provide details of the fall, but does report that she was too weak to get up to get help. She does report recent diarrhea, urinary incontinence however otherwise denies fevers of chills, abdominal pain, n/v, CP, SOB, cough, HA. She thought she had dizziness. ED workup showed SIRS criteria, UTI, elevated liver enzymes, and elevated CK.   Ms. Diez is admitted for further evaluation and management.  (Dr Tanner Villarreal)     6/17:  Still having mult loose stools and episodic crampy ab pain. C diff pending when able to collect - nurses report majority of her stools are semi-soft to loose. Her major complaint is low back pain which is chronic. BPs better today so can restart her usual pain meds. Looks like she is in and out of A flutter - Cards to see. CK 1300. WBC better at 16K.       6/18:  Fewer stools and stools are soft. Urine and blood with gram neg rods - continuing Rocephin and Flagyl for now. WBC better at 12K. Afeb. CK down to 421. Cards has seen.       6/19:  C/O \"pain all over\" and cannot be more specific. She wants more narcotics - advised to try to hold at current levels of pain meds but since Creat is ok will give a couple doses of Toradol prn. E Coli in blood and urine - sens to Rocephin - will do another day of IV and then to po med tomorrow. Cards titrating Metoprolol to control rate and recommends DC of Synthroid for now and recheck TSH in 6 wks to determine need to restart it. Since she is moving so slowly, recommended rehab but she is opposed.       6/20:   Still c/o pain \"all over\" but now mostly in back. Advised Ortho consult but she declines. Only wants more pain meds. Still very slow. Will cont Rocephin IV for now. Sed rate 50. Heart a little slower in the 100's. Will try a short course of steroids.       6/21:  Pain in right chest from rib fx worse with movement she reports to the point \"can't stand it if I move or twist.\"   Wants more pain meds - will change to Percocet. Now agrees to go to rehab. WBC up from the steroids. Does report back is better after the steroids. Still declines to have Ortho see her.       6/22:  Feeling much better this AM.  Less pain with movement. Still agreeable to go to rehab. WBC and glucose up due to steroids.      6/23:  Continues to feel improved. Still willing to go to rehab. Walked in [place] some yesterday. WBC and glucose trending down. Switching to po Omnicef.   Stable for rehab.    310PM:  Pt now doing much better and declines rehab and wants to go home today. She did well walking in [place] and PT has cleared her to go home with HHC/PT/OT. Will discharge pt  Home per her request with close follow up later this wk in office.      Review of Systems:   A comprehensive review of systems was negative except for that written in the HPI.     Objective:   Physical Exam:   Visit Vitals  /74 (BP 1 Location: Right arm, BP Patient Position: At rest)   Pulse 67   Temp 97.8 °F (36.6 °C)   Resp 18   Ht 5' 8\" (1.727 m)   Wt 110.3 kg (243 lb 3.2 oz)   SpO2 96%   BMI 36.98 kg/m²      O2 Device: Room air  Temp (24hrs), Av.6 °F (36.4 °C), Min:97.4 °F (36.3 °C), Max:97.8 °F (36.6 °C)    No intake/output data recorded.  0701 -  1900  In: 180 [P.O.:180]  Out: -   General:  Alert, cooperative, elderly, obese, no distress, appears stated age. Head:  Normocephalic, without obvious abnormality, atraumatic. Eyes:  Conjunctivae/corneas clear. EOMs intact. Throat: Lips, mucosa, and tongue moist..   Neck: Supple, symmetrical, trachea midline, no adenopathy, thyroid: no enlargement/tenderness/nodules, no carotid bruit and no JVD. Lungs:   Clear to auscultation bilaterally. Chest wall:  No tenderness or deformity except tender over area of right rib fx. Heart:  Irregular, rate ok. no murmur, click, rub or gallop. Abdomen:   Soft, with mild generalized tenderness. No R/G. Bowel sounds active. No masses,  No organomegaly. Extremities: no cyanosis or edema. No calf tenderness or cords. Pulses: 2+ and symmetric all extremities. Skin: Skin color, texture, turgor normal. No rashes. Minor abrasions on knees, elbows are healing well. Neurologic:   Alert and oriented X 3.  equal.  No cogwheeling or rigidity. Gait not tested at this time. Sensation grossly normal to touch.   Gross motor of extremities normal.        Data Review:     LIMITED ULTRASOUND OF THE ABDOMEN   6/17/20  INDICATION: Cirrhosis with common bile duct dilation  COMPARISON: CT chest abdomen pelvis 6/16/2020. TECHNIQUE:  Limited sonography of the abdomen was performed. FINDINGS:  LIVER: Mild intrahepatic biliary ductal dilation. No overt cirrhosis. The liver  is incompletely imaged. No mass is shown in the imaged portions. MAIN PORTAL VEIN: Patent. Appropriate hepatopetal flow.     GALLBLADDER: Surgically absent. COMMON DUCT: 10 mm in diameter. Dilated. PANCREAS: A small portion of the visualized proximal pancreas is normal; the  remainder is obscured. SPLEEN: Not shown.    RIGHT KIDNEY: 10.8 cm in length. No hydronephrosis, shadowing calculus, or  contour-deforming renal mass. LEFT KIDNEY: Not shown. AORTA: Normal caliber in its visualized portions. INFERIOR VENA CAVA: Normal caliber in its visualized portions. IMPRESSION:   Biliary ductal dilation.     CT Chest/Ab/Pelvis 6/16/20  INDICATION: Tachypnea, cough, ground-level fall 2 days ago, hypothermia. Back  and left hip pain  COMPARISON: None  CONTRAST: None.   FINDINGS:  CHEST WALL: No mass or axillary lymphadenopathy. THYROID: No nodule. MEDIASTINUM: No mass or lymphadenopathy. GELA: No mass or lymphadenopathy. THORACIC AORTA: No aneurysm. MAIN PULMONARY ARTERY: Normal in caliber. TRACHEA/BRONCHI: Patent. ESOPHAGUS: No wall thickening or dilatation. HEART: Normal in size. PLEURA: No effusion or pneumothorax. LUNGS: 5 mm right upper lobe nodule (series 2, image 15). 2 mm left upper lobe  nodule (image 13). 2 mm right upper lobe nodule (series 2, image 29). The absence of intravenous contrast material reduces the sensitivity for  evaluation of the solid parenchymal organs of the abdomen.  No focal  abnormalities are seen within the liver, spleen, pancreas or adrenal glands or  kidneys.  No renal or ureteral calculus or obstruction is identified. Cholecystectomy clips are present.  There is a left renal 5.5 cm cyst with  Hounsfield unit measurement of 6.4 (axial image 71). The liver is macrolobulated  with mild central biliary prominence. The aorta is atherosclerotic but tapers without aneurysm. There is no  retroperitoneal adenopathy or mass. The bowel is not obstructed. There are multiple sigmoid diverticula. The  appendix is normal on axial image 101. There is no ascites or free  intraperitoneal air. The uterus is small. There is no pelvic mass or adenopathy. The right total hip replacement in place creates a moderate amount of streak  artifact. There is disc space narrowing at multiple lumbar levels. There is an acute left lateral 11th rib fracture (sagittal image 115)  IMPRESSION:   Pulmonary micronodules  Incidental sigmoid diverticulosis. Cirrhosis with mild intrahepatic biliary dilatation.     EXAM:  CT CERVICAL SPINE WITHOUT CONTRAST 6/16/20  INDICATION: Limited ROM, pain, GLF.  COMPARISON: None. CONTRAST:  None.   FINDINGS:  There is no acute fracture or dislocation. C5-6 and C6-7 show some prominent  change of disc degeneration and spondylosis of.  Prominent facet hypertrophy seen on the right at C3-C4. On the left at C4-C5. Bilateral foraminal narrowing seen at C5-6 and C6-7. Mild canal stenosis seen at  those 2 level by spur. IMPRESSION: Prominent spondylosis, no acute findings seen.     ECHO 6/18/20  Interpretation Summary   Result status: Final result   · Image quality for this study was suboptimal.  · Normal cavity size and systolic function (ejection fraction normal). Mildly increased wall thickness. Calculated left ventricular ejection fraction is 55%. · Not well visualized. · Probably trileaflet aortic valve. Aortic valve sclerosis. · Appears normal. Mild mitral annular calcification. Did not appreciate any MR in the images provided.   · Mild tricuspid valve regurgitation is present.      Signed:  Ashutosh Teague MD  6/23/2020  3:17 PM

## 2020-06-23 NOTE — PROGRESS NOTES
Daily Progress Note and Discharge Note: 6/23/2020  Keith Maldonado MD    Assessment/Plan:    SIRS (systemic inflammatory response syndrome)/Sepsis due to UTI (E Coli) with 4/4 SIRS  - treat as below     Hypothermia: likely environmental as patient was found in her yard, leukocytosis, tachycardia, tachypnea. Likely from UTI. Not severe sepsis. Lactate WNR. Also noting diarrhea. C. Diff not obtained due to stools becoming more firm. IV CTX and IV Flagyl initially - then to CTX. UTI  - gram neg compa in urine (E Coli) and blood  - initial coverage with Rocephin and Flagyl - to Rocephin 6/18 - to po Omnicef 6/23      Fall: found down in her yard by neighbor, soiled in her own urine and feces due to inability to get up. -CM consult  -pt declining to consider rehab    Atrial flutter with RVR:   - Continue metoprolol and Eliquis. Cardiology consulted and adjusting BB. TTE ok      Cirrhosis:  Denies ETOH use. GI consulted      Renal insufficiency / Rhabdomyolysis: continue IVF. Follow BMP. CK improved      Rib fracture: pain control, incentive spirometer      Lung nodules: will need outpatient follow up      Hypothyroidism: TSH low.  Synthroid held for now.       HTN (hypertension):  Cont metoprolol otherwise hold other antihypertensives or meds as per Cardiology orders    Chronic Low back pain  - tx as needed      Code Status: FULL     Problem List:  Problem List as of 6/23/2020 Date Reviewed: 6/16/2020          Codes Class Noted - Resolved    Cirrhosis ([facility] ICD-10-CM: K74.60  ICD-9-CM: 571.5  6/16/2020 - Present        Transaminitis ICD-10-CM: R74.0  ICD-9-CM: 790.4  6/16/2020 - Present        Lung nodules ICD-10-CM: R91.8  ICD-9-CM: 793.19  6/16/2020 - Present        Atrial flutter ([facility] ICD-10-CM: I48.92  ICD-9-CM: 427.32  6/16/2020 - Present        HTN (hypertension) ICD-10-CM: I10  ICD-9-CM: 401.9  6/16/2020 - Present        Renal insufficiency ICD-10-CM: N28.9  ICD-9-CM: 593.9  6/16/2020 - Present        Rhabdomyolysis ICD-10-CM: M62.82  ICD-9-CM: 728.88  6/16/2020 - Present        SIRS (systemic inflammatory response syndrome) ([facility] ICD-10-CM: R65.10  ICD-9-CM: 995.90  6/16/2020 - Present        Hypothermia ICD-10-CM: T68. Ashely Ropes  ICD-9-CM: 991.6  6/16/2020 - Present        Rib fracture ICD-10-CM: S22.39XA  ICD-9-CM: 807.00  6/16/2020 - Present        Sepsis ([facility].) ICD-10-CM: A41.9  ICD-9-CM: 038.9, 995.91  6/16/2020 - Present        Hypothyroidism ICD-10-CM: E03.9  ICD-9-CM: 244.9  6/16/2020 - Present            Subjective:    75 y.o. female w/ hx of AF, HTN who presents with fall. Patient was found down in her yard by her neighbor, after lying there for 2 days. She could not provide details of the fall, but does report that she was too weak to get up to get help. She does report recent diarrhea, urinary incontinence however otherwise denies fevers of chills, abdominal pain, n/v, CP, SOB, cough, HA. She thought she had dizziness. ED workup showed SIRS criteria, UTI, elevated liver enzymes, and elevated CK. Ms. Noah Alfred is admitted for further evaluation and management. (Dr Francy Lawrence)    6/17:  Still having mult loose stools and episodic crampy ab pain. C diff pending when able to collect - nurses report majority of her stools are semi-soft to loose. Her major complaint is low back pain which is chronic. BPs better today so can restart her usual pain meds. Looks like she is in and out of A flutter - Cards to see. CK 1300. WBC better at 16K.      6/18:  Fewer stools and stools are soft. Urine and blood with gram neg rods - continuing Rocephin and Flagyl for now. WBC better at 12K. Afeb. CK down to 421. Cards has seen. 6/19:  C/O \"pain all over\" and cannot be more specific. She wants more narcotics - advised to try to hold at current levels of pain meds but since Creat is ok will give a couple doses of Toradol prn.   E Coli in blood and urine - sens to Rocephin - will do another day of IV and then to po med tomorrow. Cards titrating Metoprolol to control rate and recommends DC of Synthroid for now and recheck TSH in 6 wks to determine need to restart it. Since she is moving so slowly, recommended rehab but she is opposed. :   Still c/o pain \"all over\" but now mostly in back. Advised Ortho consult but she declines. Only wants more pain meds. Still very slow. Will cont Rocephin IV for now. Sed rate 50. Heart a little slower in the 100's. Will try a short course of steroids. :  Pain in right chest from rib fx worse with movement she reports to the point \"can't stand it if I move or twist.\"   Wants more pain meds - will change to Percocet. Now agrees to go to rehab. WBC up from the steroids. Does report back is better after the steroids. Still declines to have Ortho see her.      :  Feeling much better this AM.  Less pain with movement. Still agreeable to go to rehab. WBC and glucose up due to steroids. :  Continues to feel improved. Still willing to go to rehab. Walked in [place] some yesterday. WBC and glucose trending down. Switching to po Omnicef. Stable for rehab.    310PM:  Pt now doing much better and declines rehab and wants to go home today. She did well walking in [place] and PT has cleared her to go home with HHC/PT/OT. Will discharge pt  Home per her request with close follow up later this wk in office. Review of Systems:   A comprehensive review of systems was negative except for that written in the HPI. Objective:   Physical Exam:   Visit Vitals  /74 (BP 1 Location: Right arm, BP Patient Position: At rest)   Pulse 67   Temp 97.8 °F (36.6 °C)   Resp 18   Ht 5' 8\" (1.727 m)   Wt 110.3 kg (243 lb 3.2 oz)   SpO2 96%   BMI 36.98 kg/m²      O2 Device: Room air  Temp (24hrs), Av.6 °F (36.4 °C), Min:97.4 °F (36.3 °C), Max:97.8 °F (36.6 °C)    No intake/output data recorded.    701 - 1900  In: 180 [P.O.:180]  Out: -   General: Alert, cooperative, elderly, obese, no distress, appears stated age. Head:  Normocephalic, without obvious abnormality, atraumatic. Eyes:  Conjunctivae/corneas clear. EOMs intact. Throat: Lips, mucosa, and tongue moist..   Neck: Supple, symmetrical, trachea midline, no adenopathy, thyroid: no enlargement/tenderness/nodules, no carotid bruit and no JVD. Lungs:   Clear to auscultation bilaterally. Chest wall:  No tenderness or deformity except tender over area of right rib fx. Heart:  Irregular, rate ok. no murmur, click, rub or gallop. Abdomen:   Soft, with mild generalized tenderness. No R/G. Bowel sounds active. No masses,  No organomegaly. Extremities: no cyanosis or edema. No calf tenderness or cords. Pulses: 2+ and symmetric all extremities. Skin: Skin color, texture, turgor normal. No rashes. Minor abrasions on knees, elbows are healing well. Neurologic:   Alert and oriented X 3.  equal.  No cogwheeling or rigidity. Gait not tested at this time. Sensation grossly normal to touch. Gross motor of extremities normal.       Data Review:     LIMITED ULTRASOUND OF THE ABDOMEN   6/17/20  INDICATION: Cirrhosis with common bile duct dilation  COMPARISON: CT chest abdomen pelvis 6/16/2020. TECHNIQUE:  Limited sonography of the abdomen was performed. FINDINGS:  LIVER: Mild intrahepatic biliary ductal dilation. No overt cirrhosis. The liver  is incompletely imaged. No mass is shown in the imaged portions. MAIN PORTAL VEIN: Patent. Appropriate hepatopetal flow. GALLBLADDER: Surgically absent. COMMON DUCT: 10 mm in diameter. Dilated. PANCREAS: A small portion of the visualized proximal pancreas is normal; the  remainder is obscured. SPLEEN: Not shown. RIGHT KIDNEY: 10.8 cm in length. No hydronephrosis, shadowing calculus, or  contour-deforming renal mass. LEFT KIDNEY: Not shown. AORTA: Normal caliber in its visualized portions.   INFERIOR VENA CAVA: Normal caliber in its visualized portions. IMPRESSION:   Biliary ductal dilation. CT Chest/Ab/Pelvis 6/16/20  INDICATION: Tachypnea, cough, ground-level fall 2 days ago, hypothermia. Back  and left hip pain  COMPARISON: None  CONTRAST: None.   FINDINGS:  CHEST WALL: No mass or axillary lymphadenopathy. THYROID: No nodule. MEDIASTINUM: No mass or lymphadenopathy. GELA: No mass or lymphadenopathy. THORACIC AORTA: No aneurysm. MAIN PULMONARY ARTERY: Normal in caliber. TRACHEA/BRONCHI: Patent. ESOPHAGUS: No wall thickening or dilatation. HEART: Normal in size. PLEURA: No effusion or pneumothorax. LUNGS: 5 mm right upper lobe nodule (series 2, image 15). 2 mm left upper lobe  nodule (image 13). 2 mm right upper lobe nodule (series 2, image 29). The absence of intravenous contrast material reduces the sensitivity for  evaluation of the solid parenchymal organs of the abdomen. No focal  abnormalities are seen within the liver, spleen, pancreas or adrenal glands or  kidneys. No renal or ureteral calculus or obstruction is identified. Cholecystectomy clips are present. There is a left renal 5.5 cm cyst with  Hounsfield unit measurement of 6.4 (axial image 71). The liver is macrolobulated  with mild central biliary prominence. The aorta is atherosclerotic but tapers without aneurysm. There is no  retroperitoneal adenopathy or mass. The bowel is not obstructed. There are multiple sigmoid diverticula. The  appendix is normal on axial image 101. There is no ascites or free  intraperitoneal air. The uterus is small. There is no pelvic mass or adenopathy. The right total hip replacement in place creates a moderate amount of streak  artifact. There is disc space narrowing at multiple lumbar levels. There is an acute left lateral 11th rib fracture (sagittal image 115)  IMPRESSION:   Pulmonary micronodules  Incidental sigmoid diverticulosis. Cirrhosis with mild intrahepatic biliary dilatation.     EXAM:  CT CERVICAL SPINE WITHOUT CONTRAST 6/16/20  INDICATION: Limited ROM, pain, GLF.  COMPARISON: None. CONTRAST:  None.   FINDINGS:  There is no acute fracture or dislocation. C5-6 and C6-7 show some prominent  change of disc degeneration and spondylosis of.  Prominent facet hypertrophy seen on the right at C3-C4. On the left at C4-C5. Bilateral foraminal narrowing seen at C5-6 and C6-7. Mild canal stenosis seen at  those 2 level by spur. IMPRESSION: Prominent spondylosis, no acute findings seen. ECHO 6/18/20  Interpretation Summary   Result status: Final result   · Image quality for this study was suboptimal.  · Normal cavity size and systolic function (ejection fraction normal). Mildly increased wall thickness. Calculated left ventricular ejection fraction is 55%. · Not well visualized. · Probably trileaflet aortic valve. Aortic valve sclerosis. · Appears normal. Mild mitral annular calcification. Did not appreciate any MR in the images provided. · Mild tricuspid valve regurgitation is present.          Recent Days:  Recent Labs     06/23/20 0318 06/22/20  0100 06/21/20  0525   WBC 12.8* 19.5* 15.3*   HGB 10.8* 10.1* 10.8*   HCT 35.1 33.0* 34.9*   * 460* 439*     Recent Labs     06/23/20 0318 06/22/20  0100 06/21/20  0525    136 138   K 3.5 3.7 3.9    100 103   CO2 29 29 27   * 344* 359*   BUN 20 18 14   CREA 1.07* 1.03* 1.17*   CA 8.5 8.7 9.1   ALB 2.9* 3.0* 3.0*   TBILI 0.3 0.4 0.4   ALT 47 55 66       24 Hour Results:  Recent Results (from the past 24 hour(s))   CK    Collection Time: 06/23/20  3:18 AM   Result Value Ref Range    CK 39 26 - 192 U/L   CBC WITH AUTOMATED DIFF    Collection Time: 06/23/20  3:18 AM   Result Value Ref Range    WBC 12.8 (H) 3.6 - 11.0 K/uL    RBC 4.12 3.80 - 5.20 M/uL    HGB 10.8 (L) 11.5 - 16.0 g/dL    HCT 35.1 35.0 - 47.0 %    MCV 85.2 80.0 - 99.0 FL    MCH 26.2 26.0 - 34.0 PG    MCHC 30.8 30.0 - 36.5 g/dL    RDW 16.9 (H) 11.5 - 14.5 %    PLATELET 095 (H) 912 - 400 K/uL MPV 9.7 8.9 - 12.9 FL    NRBC 0.0 0  WBC    ABSOLUTE NRBC 0.00 0.00 - 0.01 K/uL    NEUTROPHILS 79 (H) 32 - 75 %    BAND NEUTROPHILS 1 %    LYMPHOCYTES 9 (L) 12 - 49 %    MONOCYTES 7 5 - 13 %    EOSINOPHILS 0 0 - 7 %    BASOPHILS 0 0 - 1 %    METAMYELOCYTES 2 %    MYELOCYTES 2 %    IMMATURE GRANULOCYTES 0 0.0 - 0.5 %    ABS. NEUTROPHILS 10.2 (H) 1.8 - 8.0 K/UL    ABS. LYMPHOCYTES 1.2 0.8 - 3.5 K/UL    ABS. MONOCYTES 0.9 0.0 - 1.0 K/UL    ABS. EOSINOPHILS 0.0 0.0 - 0.4 K/UL    ABS. BASOPHILS 0.0 0.0 - 0.1 K/UL    ABS. IMM. GRANS. 0.0 0.00 - 0.04 K/UL    DF MANUAL      RBC COMMENTS ANISOCYTOSIS  1+        RBC COMMENTS POLYCHROMASIA  PRESENT        RBC COMMENTS OVALOCYTES  PRESENT        WBC COMMENTS TOXIC GRANULATION     METABOLIC PANEL, COMPREHENSIVE    Collection Time: 06/23/20  3:18 AM   Result Value Ref Range    Sodium 138 136 - 145 mmol/L    Potassium 3.5 3.5 - 5.1 mmol/L    Chloride 103 97 - 108 mmol/L    CO2 29 21 - 32 mmol/L    Anion gap 6 5 - 15 mmol/L    Glucose 225 (H) 65 - 100 mg/dL    BUN 20 6 - 20 MG/DL    Creatinine 1.07 (H) 0.55 - 1.02 MG/DL    BUN/Creatinine ratio 19 12 - 20      GFR est AA >60 >60 ml/min/1.73m2    GFR est non-AA 50 (L) >60 ml/min/1.73m2    Calcium 8.5 8.5 - 10.1 MG/DL    Bilirubin, total 0.3 0.2 - 1.0 MG/DL    ALT (SGPT) 47 12 - 78 U/L    AST (SGOT) 19 15 - 37 U/L    Alk.  phosphatase 80 45 - 117 U/L    Protein, total 6.0 (L) 6.4 - 8.2 g/dL    Albumin 2.9 (L) 3.5 - 5.0 g/dL    Globulin 3.1 2.0 - 4.0 g/dL    A-G Ratio 0.9 (L) 1.1 - 2.2         Medications reviewed  Current Facility-Administered Medications   Medication Dose Route Frequency    cefTRIAXone (ROCEPHIN) 2 g in 0.9% sodium chloride (MBP/ADV) 50 mL  2 g IntraVENous Q24H    oxyCODONE-acetaminophen (PERCOCET 10)  mg per tablet 1 Tab  1 Tab Oral Q4H PRN    metoprolol tartrate (LOPRESSOR) tablet 50 mg  50 mg Oral Q6H    gabapentin (NEURONTIN) capsule 300 mg  300 mg Oral TID    HYDROcodone-acetaminophen (NORCO)  mg tablet 1 Tab  1 Tab Oral Q6H PRN    sodium chloride (NS) flush 5-40 mL  5-40 mL IntraVENous Q8H    sodium chloride (NS) flush 5-40 mL  5-40 mL IntraVENous PRN    acetaminophen (TYLENOL) tablet 650 mg  650 mg Oral Q6H PRN    naloxone (NARCAN) injection 0.4 mg  0.4 mg IntraVENous PRN    apixaban (ELIQUIS) tablet 5 mg  5 mg Oral BID    pantoprazole (PROTONIX) tablet 40 mg  40 mg Oral ACB    pravastatin (PRAVACHOL) tablet 20 mg  20 mg Oral QHS     Care Plan discussed with: Patient and Nurse and Case Management  Total time spent with patient: 30 minutes.   Ren Yap MD

## 2022-11-29 NOTE — ADT AUTH CERT NOTES
PER MESSAGE FROM NURSE: 
 
 
Pretty sure MD will not agree to downgrade now they have positive blood cx. Facility Name: [address]           
  
  
  
  
  
   
Patient Demographics Patient Name Jess Bennett Sex Female  
1945 Address [address] [place] Phone [phone] (Home) Hospital Account Name Acct ID Class Status Primary Coverage Jess Bennett [id number] INPATIENT Open HU[employer]NA MEDICARE - [facility] HU[employer]NA MEDICARE CHOICE PPO/PFFS  
  
   
Guarantor Account (for Hospital Account [de-identified]) Name Relation to Pt Service Area Active? Acct Type Jess Bennett Self BONSC Yes Personal/Family Address Phone [address] [facility], [address] [place] [address] [phone](V)    
  
   
Coverage Information (for Hospital Account [de-identified]) F/O Payor/Plan Subscriber  Subscriber Sex Precert # HUMANA MEDICARE/BSHSI HU[employer]NA MEDICARE CHOICE PPO/PFFS [birth year] F Subscriber Subscriber # Jess Bennett [id number] Grp # Group Name [id number] Address Phone [address] [address] [address] [phone] Policy Number Status Effective Date Benefits Phone [id number] -  - Auth/Cert REF# [id number]  
  
   
Diagnosis Codes Comments Urinary tract infection with hematuria, site unspecified  ICD-10-CM: N39.0, R31.9 ICD-9-CM: 599.0, 599.70   
  
   
Admission Information Arrival Date/Time: 2020 1352 Admit Date/Time: 2020 1352 IP Adm. Date/Time: 2020 1644 Admission Type: Emergency Point of Origin: Non-health Care Facility/self Admit Category: Home Means of Arrival: Ambulance Primary Service: Medicine Secondary Service: N/A Transfer Source:  Service Area: [address] [place] [address] Unit: [facility] 5M1 MED SURG 1 Admit Provider: Melody Crain MD Attending Provider: Branden yLnch MD Referring Provider:   
Admission Information Attending Provider Admission Dx Admitted On Albert Neves MD Sepsis [facility]) 20 Service Isolation Code Status MEDICINE  Full Code Allergies Advance Care Planning Metformin Jump to the Activity    
Admission Information Unit/Bed: [facility] 5 MED SURG 1/01 Service: MEDICINE Admitting provider: Berta Gillespie MD Phone: [phone] Attending provider: Kwabena Dave MD Phone: [phone] PCP: Marsha Pillai MD Phone: [phone] Admission dx:  Patient class: I Admission type: ER    
 
 
 Cantharidin Pregnancy And Lactation Text: The use of this medication during pregnancy or lactation is not recommended as there is insufficient data.